# Patient Record
Sex: FEMALE | Race: WHITE | HISPANIC OR LATINO | Employment: UNEMPLOYED | URBAN - METROPOLITAN AREA
[De-identification: names, ages, dates, MRNs, and addresses within clinical notes are randomized per-mention and may not be internally consistent; named-entity substitution may affect disease eponyms.]

---

## 2020-10-12 ENCOUNTER — GYNECOLOGY VISIT (OUTPATIENT)
Dept: OBGYN | Facility: CLINIC | Age: 35
End: 2020-10-12

## 2020-10-12 VITALS
HEIGHT: 64 IN | SYSTOLIC BLOOD PRESSURE: 114 MMHG | WEIGHT: 170 LBS | DIASTOLIC BLOOD PRESSURE: 70 MMHG | BODY MASS INDEX: 29.02 KG/M2

## 2020-10-12 DIAGNOSIS — Z32.01 POSITIVE PREGNANCY TEST: ICD-10-CM

## 2020-10-12 DIAGNOSIS — N93.8 DUB (DYSFUNCTIONAL UTERINE BLEEDING): ICD-10-CM

## 2020-10-12 DIAGNOSIS — N92.6 MISSED MENSES: ICD-10-CM

## 2020-10-12 LAB
INT CON NEG: NEGATIVE
INT CON POS: POSITIVE
POC URINE PREGNANCY TEST: POSITIVE

## 2020-10-12 PROCEDURE — 76857 US EXAM PELVIC LIMITED: CPT | Performed by: OBSTETRICS & GYNECOLOGY

## 2020-10-12 PROCEDURE — 99202 OFFICE O/P NEW SF 15 MIN: CPT | Mod: 25 | Performed by: OBSTETRICS & GYNECOLOGY

## 2020-10-12 PROCEDURE — 81025 URINE PREGNANCY TEST: CPT | Performed by: OBSTETRICS & GYNECOLOGY

## 2020-10-12 NOTE — PROGRESS NOTES
GYN Visit    CC: missed menses    HPI: 34 y.o.  c/o missed period.  Patient's last menstrual period was 2020 (exact date).  Sure of period.  No VB currently, minimal nausea.  No concerns today.    Would be 12-week 3-day by sure LMP today.    ROS:  gen: feels well, denies concerns  abd: denies pain, negative nausea, negative vomiting  : denies vaginal bleeding, discharge, pain    OB History    Para Term  AB Living   2 1 1     1   SAB TAB Ectopic Molar Multiple Live Births             1      # Outcome Date GA Lbr Rafael/2nd Weight Sex Delivery Anes PTL Lv   2 Term 13 40w0d  3.175 kg (7 lb) F Vag-Spont  N MICHAEL   1                 Past Medical History:   Diagnosis Date   • Allergy    • Urinary tract infection, site not specified      Past Surgical History:   Procedure Laterality Date   • APPENDECTOMY     • OTHER ABDOMINAL SURGERY       Current Outpatient Medications on File Prior to Visit   Medication Sig Dispense Refill   • Prenatal MV-Min-Fe Fum-FA-DHA (PRENATAL 1 PO) Take  by mouth.     • docusate sodium (COLACE) 100 MG CAPS Take 1 Cap by mouth 2 times a day as needed for Constipation. (Patient not taking: Reported on 10/12/2020) 60 Cap 0   • ferrous sulfate 325 (65 FE) MG EC tablet Take 1 Tab by mouth every day. (Patient not taking: Reported on 10/12/2020) 30 Tab 1   • ibuprofen (MOTRIN) 600 MG TABS Take 1 Tab by mouth every 6 hours as needed (Cramping). (Patient not taking: Reported on 10/12/2020) 30 Tab 1   • oxycodone-acetaminophen (PERCOCET) 5-325 MG TABS Take 1 Tab by mouth every four hours as needed ((Pain Scale 4-6)). (Patient not taking: Reported on 10/12/2020) 15 Tab 0     No current facility-administered medications on file prior to visit.      Allergies: Nkda [no known drug allergy]    Family History   Problem Relation Age of Onset   • Diabetes Mother         Pills     Social History     Tobacco Use   • Smoking status: Never Smoker   • Smokeless tobacco: Never  "Used   Substance Use Topics   • Alcohol use: No   • Drug use: Never       PE:   /70   Ht 1.626 m (5' 4\")   Wt 77.1 kg (170 lb)   LMP 2020 (Exact Date)   BMI 29.18 kg/m²   gen; AAO, NAD, affect appropriate  abd: soft, NT, ND, no organomegaly  Ext: NT, edema  Skin: dry, intact, no lesions    OB US performed and per my read:    Indication: missed menses, +UPT    Transabdominal U/S  Single intrauterine pregnancy identified, variable position  Crown-rump length 5.86 cm, 12-week 3-days   positive fetal cardiac activity visualized.  Fluid grossly normal.  Right and left ovary not visualized, no masses noted.       Impression:   single live intrauterine pregnancy @ 12w3d .   EDC by US of 2021      A/P: 34 y.o.  w/ missed menses, +UPT  Viable IUP confirmed today  Dating: CROW 2021 by lmp c/w 12wk US today       RTC for NOB visit    Zhanna Porter MD  [facility], Women's Health    "

## 2020-10-12 NOTE — NON-PROVIDER
Pt here for DUB.    Pt states no complaints   Good# [phone]  LMP: 7/17/2020,  12w3d today   Pharmacy confirmed

## 2020-10-22 ENCOUNTER — APPOINTMENT (OUTPATIENT)
Dept: OBGYN | Facility: CLINIC | Age: 35
End: 2020-10-22
Payer: MEDICAID

## 2020-10-28 ENCOUNTER — APPOINTMENT (OUTPATIENT)
Dept: OBGYN | Facility: CLINIC | Age: 35
End: 2020-10-28

## 2020-10-29 ENCOUNTER — INITIAL PRENATAL (OUTPATIENT)
Dept: OBGYN | Facility: CLINIC | Age: 35
End: 2020-10-29

## 2020-10-29 ENCOUNTER — HOSPITAL ENCOUNTER (OUTPATIENT)
Facility: MEDICAL CENTER | Age: 35
End: 2020-10-29
Attending: NURSE PRACTITIONER
Payer: COMMERCIAL

## 2020-10-29 VITALS — WEIGHT: 173.6 LBS | DIASTOLIC BLOOD PRESSURE: 64 MMHG | SYSTOLIC BLOOD PRESSURE: 110 MMHG | BODY MASS INDEX: 29.8 KG/M2

## 2020-10-29 DIAGNOSIS — Z34.82 ENCOUNTER FOR SUPERVISION OF OTHER NORMAL PREGNANCY IN SECOND TRIMESTER: Primary | ICD-10-CM

## 2020-10-29 DIAGNOSIS — Z34.81 ENCOUNTER FOR SUPERVISION OF OTHER NORMAL PREGNANCY IN FIRST TRIMESTER: ICD-10-CM

## 2020-10-29 LAB
APPEARANCE UR: NORMAL
BILIRUB UR STRIP-MCNC: NORMAL MG/DL
COLOR UR AUTO: NORMAL
GLUCOSE UR STRIP.AUTO-MCNC: NEGATIVE MG/DL
KETONES UR STRIP.AUTO-MCNC: NEGATIVE MG/DL
LEUKOCYTE ESTERASE UR QL STRIP.AUTO: NORMAL
NITRITE UR QL STRIP.AUTO: NEGATIVE
PH UR STRIP.AUTO: 7 [PH] (ref 5–8)
PROT UR QL STRIP: NEGATIVE MG/DL
RBC UR QL AUTO: NEGATIVE
SP GR UR STRIP.AUTO: 1.02
UROBILINOGEN UR STRIP-MCNC: NORMAL MG/DL

## 2020-10-29 PROCEDURE — 90471 IMMUNIZATION ADMIN: CPT | Performed by: NURSE PRACTITIONER

## 2020-10-29 PROCEDURE — 8198 PREG CTR PKG RATE (SYSTEM): Performed by: NURSE PRACTITIONER

## 2020-10-29 PROCEDURE — 0500F INITIAL PRENATAL CARE VISIT: CPT | Performed by: NURSE PRACTITIONER

## 2020-10-29 PROCEDURE — 81002 URINALYSIS NONAUTO W/O SCOPE: CPT | Performed by: NURSE PRACTITIONER

## 2020-10-29 PROCEDURE — 90686 IIV4 VACC NO PRSV 0.5 ML IM: CPT | Performed by: NURSE PRACTITIONER

## 2020-10-29 ASSESSMENT — ENCOUNTER SYMPTOMS
GASTROINTESTINAL NEGATIVE: 1
CARDIOVASCULAR NEGATIVE: 1
MUSCULOSKELETAL NEGATIVE: 1
NEUROLOGICAL NEGATIVE: 1
RESPIRATORY NEGATIVE: 1
CONSTITUTIONAL NEGATIVE: 1
EYES NEGATIVE: 1
PSYCHIATRIC NEGATIVE: 1

## 2020-10-29 ASSESSMENT — EDINBURGH POSTNATAL DEPRESSION SCALE (EPDS)
I HAVE FELT SCARED OR PANICKY FOR NO GOOD REASON: NO, NOT AT ALL
I HAVE LOOKED FORWARD WITH ENJOYMENT TO THINGS: RATHER LESS THAN I USED TO
I HAVE BLAMED MYSELF UNNECESSARILY WHEN THINGS WENT WRONG: NO, NEVER
THINGS HAVE BEEN GETTING ON TOP OF ME: NO, I HAVE BEEN COPING AS WELL AS EVER
I HAVE BEEN ANXIOUS OR WORRIED FOR NO GOOD REASON: HARDLY EVER
I HAVE BEEN SO UNHAPPY THAT I HAVE BEEN CRYING: NO, NEVER
I HAVE FELT SAD OR MISERABLE: NO, NOT AT ALL
THE THOUGHT OF HARMING MYSELF HAS OCCURRED TO ME: NEVER
I HAVE BEEN SO UNHAPPY THAT I HAVE HAD DIFFICULTY SLEEPING: NOT AT ALL
TOTAL SCORE: 2
I HAVE BEEN ABLE TO LAUGH AND SEE THE FUNNY SIDE OF THINGS: AS MUCH AS I ALWAYS COULD

## 2020-10-29 NOTE — PROGRESS NOTES
NOB today  LMP: 07/17/2020  Last pap: Pt states she had one done in [place] about a year ago  Phone # [phone]  Pharmacy confirmed  On PNV  Cystic Fibrosis test offered and declined   Flu vaccine today  Flu vaccine given today, Left deltoid. Screening checklist reviewed with pt. Verified by Ju KEYS  Pt states that she was having some cramping and she was having spotting  c/o

## 2020-10-29 NOTE — LETTER
October 29, 2020            Lindsay Coulter is currently being cared for at [facility].  This patient is pregnant and may continue to work.  She should not lift greater than 20 pounds and requires frequent rest periods (10 minutes every two hours). 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

## 2020-10-30 DIAGNOSIS — Z34.82 ENCOUNTER FOR SUPERVISION OF OTHER NORMAL PREGNANCY IN SECOND TRIMESTER: ICD-10-CM

## 2020-11-02 LAB
C TRACH DNA GENITAL QL NAA+PROBE: NEGATIVE
CYTOLOGY REG CYTOL: NORMAL
HPV HR 12 DNA CVX QL NAA+PROBE: NEGATIVE
HPV16 DNA SPEC QL NAA+PROBE: NEGATIVE
HPV18 DNA SPEC QL NAA+PROBE: NEGATIVE
N GONORRHOEA DNA GENITAL QL NAA+PROBE: NEGATIVE
SPECIMEN SOURCE: NORMAL
SPECIMEN SOURCE: NORMAL

## 2020-11-06 PROBLEM — O09.529 ENCOUNTER FOR SUPERVISION OF HIGH-RISK PREGNANCY WITH MULTIGRAVIDA OF ADVANCED MATERNAL AGE: Status: ACTIVE | Noted: 2020-11-06

## 2020-12-02 ENCOUNTER — HOSPITAL ENCOUNTER (OUTPATIENT)
Dept: LAB | Facility: MEDICAL CENTER | Age: 35
End: 2020-12-02
Attending: NURSE PRACTITIONER
Payer: COMMERCIAL

## 2020-12-02 DIAGNOSIS — Z34.82 ENCOUNTER FOR SUPERVISION OF OTHER NORMAL PREGNANCY IN SECOND TRIMESTER: ICD-10-CM

## 2020-12-02 LAB
ABO GROUP BLD: NORMAL
BASOPHILS # BLD AUTO: 0.5 % (ref 0–1.8)
BASOPHILS # BLD: 0.05 K/UL (ref 0–0.12)
BLD GP AB SCN SERPL QL: NORMAL
EOSINOPHIL # BLD AUTO: 0.29 K/UL (ref 0–0.51)
EOSINOPHIL NFR BLD: 3.1 % (ref 0–6.9)
ERYTHROCYTE [DISTWIDTH] IN BLOOD BY AUTOMATED COUNT: 49.3 FL (ref 35.9–50)
HBV SURFACE AG SER QL: ABNORMAL
HCT VFR BLD AUTO: 37.7 % (ref 37–47)
HCV AB SER QL: ABNORMAL
HGB BLD-MCNC: 12.5 G/DL (ref 12–16)
HIV 1+2 AB+HIV1 P24 AG SERPL QL IA: NORMAL
IMM GRANULOCYTES # BLD AUTO: 0.05 K/UL (ref 0–0.11)
IMM GRANULOCYTES NFR BLD AUTO: 0.5 % (ref 0–0.9)
LYMPHOCYTES # BLD AUTO: 1.17 K/UL (ref 1–4.8)
LYMPHOCYTES NFR BLD: 12.7 % (ref 22–41)
MCH RBC QN AUTO: 31.3 PG (ref 27–33)
MCHC RBC AUTO-ENTMCNC: 33.2 G/DL (ref 33.6–35)
MCV RBC AUTO: 94.5 FL (ref 81.4–97.8)
MONOCYTES # BLD AUTO: 0.54 K/UL (ref 0–0.85)
MONOCYTES NFR BLD AUTO: 5.9 % (ref 0–13.4)
NEUTROPHILS # BLD AUTO: 7.12 K/UL (ref 2–7.15)
NEUTROPHILS NFR BLD: 77.3 % (ref 44–72)
NRBC # BLD AUTO: 0 K/UL
NRBC BLD-RTO: 0 /100 WBC
PLATELET # BLD AUTO: 197 K/UL (ref 164–446)
PMV BLD AUTO: 10.6 FL (ref 9–12.9)
RBC # BLD AUTO: 3.99 M/UL (ref 4.2–5.4)
RH BLD: NORMAL
RUBV AB SER QL: 107 IU/ML
TREPONEMA PALLIDUM IGG+IGM AB [PRESENCE] IN SERUM OR PLASMA BY IMMUNOASSAY: ABNORMAL
WBC # BLD AUTO: 9.2 K/UL (ref 4.8–10.8)

## 2020-12-05 LAB
# FETUSES US: NORMAL
AFP MOM SERPL: 1.6
AFP SERPL-MCNC: 79 NG/ML
AGE - REPORTED: 36.5 YR
AMPHET CTO UR CFM-MCNC: NEGATIVE NG/ML
BARBITURATES CTO UR CFM-MCNC: NEGATIVE NG/ML
BENZODIAZ CTO UR CFM-MCNC: NEGATIVE NG/ML
CANNABINOIDS CTO UR CFM-MCNC: NEGATIVE NG/ML
COCAINE CTO UR CFM-MCNC: NEGATIVE NG/ML
CURRENT SMOKER: NO
DRUG COMMENT 753798: NORMAL
FAMILY MEMBER DISEASES HX: NO
GA METHOD: NORMAL
GA: NORMAL WK
HCG MOM SERPL: 1.31
HCG SERPL-ACNC: NORMAL IU/L
HX OF HEREDITARY DISORDERS: NO
IDDM PATIENT QL: NO
INHIBIN A MOM SERPL: 0.68
INHIBIN A SERPL-MCNC: 107 PG/ML
INTEGRATED SCN PATIENT-IMP: NORMAL
METHADONE CTO UR CFM-MCNC: NEGATIVE NG/ML
OPIATES CTO UR CFM-MCNC: NEGATIVE NG/ML
PATHOLOGY STUDY: NORMAL
PCP CTO UR CFM-MCNC: NEGATIVE NG/ML
PROPOXYPH CTO UR CFM-MCNC: NEGATIVE NG/ML
SPECIMEN DRAWN SERPL: NORMAL
U ESTRIOL MOM SERPL: 0.68
U ESTRIOL SERPL-MCNC: 1.66 NG/ML

## 2020-12-10 ENCOUNTER — APPOINTMENT (OUTPATIENT)
Dept: RADIOLOGY | Facility: IMAGING CENTER | Age: 35
End: 2020-12-10
Attending: NURSE PRACTITIONER

## 2020-12-10 DIAGNOSIS — Z34.82 ENCOUNTER FOR SUPERVISION OF OTHER NORMAL PREGNANCY IN SECOND TRIMESTER: ICD-10-CM

## 2020-12-10 PROCEDURE — 76805 OB US >/= 14 WKS SNGL FETUS: CPT | Mod: TC | Performed by: NURSE PRACTITIONER

## 2020-12-11 DIAGNOSIS — O28.3 ABNORMAL FETAL ULTRASOUND: Primary | ICD-10-CM

## 2020-12-14 ENCOUNTER — HOSPITAL ENCOUNTER (OUTPATIENT)
Facility: MEDICAL CENTER | Age: 35
End: 2020-12-14
Attending: NURSE PRACTITIONER
Payer: COMMERCIAL

## 2020-12-14 ENCOUNTER — ROUTINE PRENATAL (OUTPATIENT)
Dept: OBGYN | Facility: CLINIC | Age: 35
End: 2020-12-14

## 2020-12-14 VITALS — WEIGHT: 179 LBS | BODY MASS INDEX: 30.73 KG/M2 | SYSTOLIC BLOOD PRESSURE: 118 MMHG | DIASTOLIC BLOOD PRESSURE: 58 MMHG

## 2020-12-14 DIAGNOSIS — O28.3 ABNORMAL FETAL ULTRASOUND: ICD-10-CM

## 2020-12-14 DIAGNOSIS — O09.529 ENCOUNTER FOR SUPERVISION OF HIGH-RISK PREGNANCY WITH MULTIGRAVIDA OF ADVANCED MATERNAL AGE: Primary | ICD-10-CM

## 2020-12-14 DIAGNOSIS — O09.529 ENCOUNTER FOR SUPERVISION OF HIGH-RISK PREGNANCY WITH MULTIGRAVIDA OF ADVANCED MATERNAL AGE: ICD-10-CM

## 2020-12-14 PROCEDURE — 90040 PR PRENATAL FOLLOW UP: CPT | Performed by: NURSE PRACTITIONER

## 2020-12-14 NOTE — PROGRESS NOTES
S:  Pt is  at 21w3d here for routine OB follow up.  Reports some vaginal DC or liquid.  Reports good FM.  Denies VB, LOF, RUCs, or vaginal DC.  Denies cough, SOB, sore throat or fever.  Denies exposure to anyone with COVID 19.    O:    Vitals:    20 1528   Weight: 81.2 kg (179 lb)           FHTs: 150        Fundal ht: 20 cm.        AFP: wnl -- reviewed w pt.       SSE: neg ferning, neg pooling, neg nitrazine, cx appears closed    Complete OB US  12/10/2020 1:21 PM     HISTORY/REASON FOR EXAM:  Evaluate fetal anatomy, alpha-fetoprotein within normal limits     TECHNIQUE/EXAM DESCRIPTION: OB complete ultrasound.     COMPARISON:  None     FINDINGS:  Fetal Lie:  Transverse  LMP:  2020  Clinical CROW by LMP:  2021     Placenta (Location):  Anterior  Placenta Previa: No  Placental Grade: II     Amniotic Fluid Volume:  RENAN = 7.63 cm     Fetal Heart Rate:  157 bpm     Cervical Length:  4.27 cm transabdominal     No maternal adnexal mass is identified.     Umbilical Artery S/D Ratio(s):  Not applicable     Fetal Anatomy  (Seen or Not Seen)  Lateral Ventricles     Seen  Cisterna Magna        Seen  Cerebellum              Seen  CSP             Seen  Orbits             Seen  Face/Lips                Seen  Cord Insertion         Seen  Placental CI         Seen  4 Chamber Heart     NOT seen  LVOT               NOT seen  RVOT              Seen  3 Vessel View     NOT seen  Stomach       Seen  Kidneys                   Seen  Urinary Bladder      Seen  Spine                       Seen  3 Vessel Cord          Seen  Both Upper Extremities    Seen  Both Lower Extremities    Seen  Diaphragm             Seen  Movement       Seen  Gender:  Likely female     Fetal Biometry  BPD    4.19 cm, 18 weeks, 5 days, (Less than 1%)  HC    16.60 cm, 19 weeks, 2 days, (1.6%)  AC    13.58 cm, 19 weeks, (3.9%)  Femur Length    3.32 cm, 20 weeks, 3 days, (25.6%)  Humerus Length    2.93 cm, 19 weeks, 4 days, (8.9%)  Cerebellum  Diameter   2.02 cm, (15.6%)     EGA by this US:  19 weeks, 3 days  CROW by this US: 5/3/2021  CROW by 1st US:  4/23/2021 by MD     Estimated Fetal Weight:  302 grams  EFW Percentile: 4.6%     Comments:  There is limited visualization of the cardiac structures in part due to the apparent off axis positioning. Slightly limited visualization of the facial lip region.     IMPRESSION:     1.  Single intrauterine pregnancy of an estimated gestational age of 19 weeks, 3 days with an estimated date of delivery of 5/3/2021.  2.  Low RENAN of 7.63 cm.  3.  Limited visualization of cardiac structures in part due to apparent off axis positioning or malrotation of the heart.  4.  There is limited visualization of the facial and lip region.    A:  IUP 21w3d  Patient Active Problem List    Diagnosis Date Noted   • Abnormal fetal ultrasound 12/11/2020   • Encounter for supervision of high-risk pregnancy with multigravida of advanced maternal age 11/06/2020        P:  1.  Reviewed labs, ultrasound w pt.          2.  Questions answered.          3.  Encouraged adequate water intake        4.  F/u 4 wks.        5.  Oki appt - not made yet, encouraged pt to make asap.        6.  Vaginal pathogen swab sent.    Chaperone offered and provided by Renee Pinedo MA.

## 2020-12-14 NOTE — PROGRESS NOTES
Pt here today for OB follow up  Reports light FM  WT:  179 lb  BP: 118/58  Preferred pharmacy verified with pt.  Had US on 12/10/2020  Appt with Dr. Alvarado: none yet (provider to go over U/S results with pt)   Pt states has been having vaginal yellowish discharge with some odor, denies itching. States no other complaints or concerns   Good # [phone]

## 2020-12-15 LAB
CANDIDA DNA VAG QL PROBE+SIG AMP: NEGATIVE
G VAGINALIS DNA VAG QL PROBE+SIG AMP: NEGATIVE
T VAGINALIS DNA VAG QL PROBE+SIG AMP: NEGATIVE

## 2021-01-11 ENCOUNTER — ROUTINE PRENATAL (OUTPATIENT)
Dept: OBGYN | Facility: CLINIC | Age: 36
End: 2021-01-11

## 2021-01-11 VITALS — WEIGHT: 184 LBS | BODY MASS INDEX: 31.58 KG/M2 | DIASTOLIC BLOOD PRESSURE: 68 MMHG | SYSTOLIC BLOOD PRESSURE: 108 MMHG

## 2021-01-11 DIAGNOSIS — Z3A.25 25 WEEKS GESTATION OF PREGNANCY: ICD-10-CM

## 2021-01-11 DIAGNOSIS — Z87.898 HISTORY OF BIRTH TRAUMA: ICD-10-CM

## 2021-01-11 PROBLEM — Z87.59 HISTORY OF SHOULDER DYSTOCIA IN PRIOR PREGNANCY: Status: ACTIVE | Noted: 2021-01-11

## 2021-01-11 PROCEDURE — 90040 PR PRENATAL FOLLOW UP: CPT | Performed by: NURSE PRACTITIONER

## 2021-01-11 NOTE — PROGRESS NOTES
SUBJECTIVE:  Pt is a 35 y.o.   at 25w3d  gestation. Presents today for follow-up prenatal care. Reports no issues at this time.  Reports fetal movement. Denies regular cramping/contractions, bleeding or leaking of fluid. Denies dysuria, headaches, N/V. Generally feels well today. Reports she had a very bad experience with her last birth with us where she did not feel attended to, she was not helped with the pushing the phase and just generally was displeased with the way she was treated. She is worried about this happening again.     OBJECTIVE:  - See prenatal vitals flow  -   Vitals:    21 1411   BP: 108/68   Weight: 83.5 kg (184 lb)                 ASSESSMENT:   - IUP at 25w3d    - S=D   -   Patient Active Problem List    Diagnosis Date Noted   • Abnormal fetal ultrasound 2020   • Encounter for supervision of high-risk pregnancy with multigravida of advanced maternal age 2020         PLAN:  - S/sx pregnancy and labor warning signs vs general discomforts discussed  - Fetal movements and/or kick counts reviewed   - Adequate hydration reinforced  - Nutrition/exercise/vitamin education; continue PNV  - S/p Flu vacc  - Reviewed that the team in  years was very different and hopefully we can support her in having a better experience this time, including being more attentive in labor and during the pushing stage, pt did not use an epidural last time although in her chart it says she did and she is not sure if she wants to use this time either   - Third tri labs ordered  - No further follow up with Oki  - Anticipatory guidance given  - RTC in 4 weeks for follow-up prenatal care

## 2021-01-11 NOTE — PROGRESS NOTES
OB follow up   + fetal movement.  No VB, LOF or UC's.  Pt states she has been having some leaking everyday   Flu vaccine current  Pt states she saw Dr. Alvarado on 1/4/21  [phone] (home)   Preferred pharmacy confirmed.

## 2021-01-21 ENCOUNTER — HOSPITAL ENCOUNTER (OUTPATIENT)
Dept: LAB | Facility: MEDICAL CENTER | Age: 36
End: 2021-01-21
Attending: NURSE PRACTITIONER
Payer: MEDICAID

## 2021-01-21 DIAGNOSIS — Z3A.25 25 WEEKS GESTATION OF PREGNANCY: ICD-10-CM

## 2021-01-21 LAB
ERYTHROCYTE [DISTWIDTH] IN BLOOD BY AUTOMATED COUNT: 45.2 FL (ref 35.9–50)
GLUCOSE 1H P 50 G GLC PO SERPL-MCNC: 168 MG/DL (ref 70–139)
HCT VFR BLD AUTO: 35.5 % (ref 37–47)
HGB BLD-MCNC: 11.7 G/DL (ref 12–16)
MCH RBC QN AUTO: 31.5 PG (ref 27–33)
MCHC RBC AUTO-ENTMCNC: 33 G/DL (ref 33.6–35)
MCV RBC AUTO: 95.4 FL (ref 81.4–97.8)
PLATELET # BLD AUTO: 205 K/UL (ref 164–446)
PMV BLD AUTO: 10.5 FL (ref 9–12.9)
RBC # BLD AUTO: 3.72 M/UL (ref 4.2–5.4)
TREPONEMA PALLIDUM IGG+IGM AB [PRESENCE] IN SERUM OR PLASMA BY IMMUNOASSAY: NORMAL
WBC # BLD AUTO: 7.6 K/UL (ref 4.8–10.8)

## 2021-01-21 PROCEDURE — 86780 TREPONEMA PALLIDUM: CPT

## 2021-01-21 PROCEDURE — 85027 COMPLETE CBC AUTOMATED: CPT

## 2021-01-21 PROCEDURE — 36415 COLL VENOUS BLD VENIPUNCTURE: CPT

## 2021-01-21 PROCEDURE — 82950 GLUCOSE TEST: CPT

## 2021-01-22 ENCOUNTER — TELEPHONE (OUTPATIENT)
Dept: OBGYN | Facility: CLINIC | Age: 36
End: 2021-01-22

## 2021-01-22 DIAGNOSIS — R73.09 ELEVATED GLUCOSE: ICD-10-CM

## 2021-01-22 NOTE — TELEPHONE ENCOUNTER
----- Message from CRISTAL Johnson sent at 1/22/2021  9:21 AM PST -----  Pt needs three hour. Ordered      Pt notified of abnormal 1hr gtt and need to do 3hr gtt this time. Pt instructed to fast 10-12hr prior to testing. Pt informed she is only allow to drink plain water during fasting time. Advised to bring a snack for after the test is done. Pt notified will be staying in the labs for the 3hr. Pt agreed to do it Monday 1/25/2021. Pt verbalized understanding.

## 2021-01-25 ENCOUNTER — HOSPITAL ENCOUNTER (OUTPATIENT)
Dept: LAB | Facility: MEDICAL CENTER | Age: 36
End: 2021-01-25
Attending: NURSE PRACTITIONER
Payer: MEDICAID

## 2021-01-25 DIAGNOSIS — R73.09 ELEVATED GLUCOSE: ICD-10-CM

## 2021-01-25 LAB
GLUCOSE 1H P CHAL SERPL-MCNC: 170 MG/DL (ref 65–180)
GLUCOSE 2H P CHAL SERPL-MCNC: 161 MG/DL (ref 65–155)
GLUCOSE 3H P CHAL SERPL-MCNC: 134 MG/DL (ref 65–140)
GLUCOSE BS SERPL-MCNC: 80 MG/DL (ref 65–95)

## 2021-01-25 PROCEDURE — 82951 GLUCOSE TOLERANCE TEST (GTT): CPT

## 2021-01-25 PROCEDURE — 82952 GTT-ADDED SAMPLES: CPT

## 2021-01-25 PROCEDURE — 36415 COLL VENOUS BLD VENIPUNCTURE: CPT

## 2021-02-08 ENCOUNTER — ROUTINE PRENATAL (OUTPATIENT)
Dept: OBGYN | Facility: CLINIC | Age: 36
End: 2021-02-08

## 2021-02-08 VITALS — DIASTOLIC BLOOD PRESSURE: 70 MMHG | BODY MASS INDEX: 31.93 KG/M2 | SYSTOLIC BLOOD PRESSURE: 112 MMHG | WEIGHT: 186 LBS

## 2021-02-08 DIAGNOSIS — O09.529 ENCOUNTER FOR SUPERVISION OF HIGH-RISK PREGNANCY WITH MULTIGRAVIDA OF ADVANCED MATERNAL AGE: Primary | ICD-10-CM

## 2021-02-08 DIAGNOSIS — R73.09 ABNORMAL GTT (GLUCOSE TOLERANCE TEST): ICD-10-CM

## 2021-02-08 PROCEDURE — 90471 IMMUNIZATION ADMIN: CPT | Performed by: NURSE PRACTITIONER

## 2021-02-08 PROCEDURE — 90040 PR PRENATAL FOLLOW UP: CPT | Performed by: NURSE PRACTITIONER

## 2021-02-08 PROCEDURE — 90715 TDAP VACCINE 7 YRS/> IM: CPT | Performed by: NURSE PRACTITIONER

## 2021-02-08 NOTE — PROGRESS NOTES
S:  Pt is  at 29w3d for routine OB follow up.  No c/o today.  Reports good FM.  Denies VB, LOF, RUCs or vaginal DC. Denies cough, SOB, sore throat or fever.  Denies exposure to anyone with COVID 19.    O:    Vitals:    21 1058   BP: 112/70   Weight: 84.4 kg (186 lb)           FHTs: 150        Fundal ht: 28 cm.    A:  IUP at 29w3d  Patient Active Problem List    Diagnosis Date Noted   • Abnormal GTT (glucose tolerance test) 2021   • History of birth trauma 2021   • History of shoulder dystocia x 20 sec in prior pregnancy 2021   • Abnormal fetal ultrasound 2020   • Encounter for supervision of high-risk pregnancy with multigravida of advanced maternal age 2020        P:  1.  PP contraception condoms.  Declined BTL.         2.  Instructions given on FKCs.          3.  Questions answered.          4.  Encourage good hand hygiene, social distancing and avoiding sick contacts.        5.  Encourage adequate water intake.        6.  1hr abnormal > 3hr WNL - reviewed w pt.        7.   labor precautions reviewed.         8.  F/u 2 wks.        9.  TDap given.    I have placed the below orders and discussed them with an approved delegating provider. The MA is performing the below orders under the direction of  Dr. Frausto.

## 2021-02-08 NOTE — LETTER
Cuente los Movimientos de delgado Bebé  Otro paso importante para la star de delgado bebé    Lindsay Coulter     [facility] [place][note 2]            Dept: [phone]    ¿Cuántas semanas tiene de embarazo? 29w3d    Fecha cuando tiene que comenzar a contar el movimiento: 2/8/21                  Luis A debe usar maldonado diagrama    Octavio manera en que delgado doctor puede controlar a star de delgado bebé es sabiendo cuantas veces se mueve delgado bebé en el útero, o por medio de las “pataditas”.  Usted podrá ayudarle a delgado médico al usar cada día el siguiente diagrama.    Cada día, usted debe prestar atención a cuantas horas le lleva a delgado bebé moverse 10 veces.  Comience a contar en la mañana, lo antes posible después de haberse levantado.    · Primeramente, escriba la hora en que se mueve delgado bebé, hasta llegar a 10 veces.  · Colóquele un check o palomita a cada cuadrito cada vez que delgado bebé se mueva hasta que complete 10 veces.  · Escriba la hora cuando termine de contar 10 veces en la última columna.  · Sume el total del tiempo que le llevó contar los 10 movimientos.  · Finalmente, complete el cuadrito de cuantas horas le llevó hacerlo.    Después de fabien contado los 10 movimientos, ya no tendrá que contar los demás movimientos por el max del día.  A la mañana siguiente, comience a contar de nuevo cuantas veces se mueve el bebé desde el momento en que se levante.    ¿Qué tendría que considerarse un “movimiento”?  Es difícil de decirlo porque es distinto de octavio madre a otra, y de un embarazo a otro.  Lo importante es que cuente el movimiento de la misma manera cm el transcurso de delgado embarazo.  Si tiene preguntas adicionales, pregúntele a delgado doctor.    ¡Cuente cuidadosamente cada día!     MUESTRA:  Semana 28    ¿Cuántas horas le ha llevado sentir 10 movimientos?        Hora de Inicio     1     2     3     4     5     6     7     8     9     10   Hora de Finlizar   Maritza. 8:20 ·  ·  ·  ·  ·  ·  ·  ·  ·  ·  11:40      Mar.               Mié.               Jue.               Vie.               Sáb.               Dom.                 IMPORTANTE:  Usted debe contactar a delgado doctor si le lleva más de 2 horas sentir 10 movimientos de delgado bebé.    Cada mañana, escriba la hora de inicio y comience a contar los movimientos de delgado bebé.  Hágalo colocándole un check o palomita a cada cuadrito cada vez que sienta un movimiento de delgado bebé.  Cuando haya sentido 10 “pataditas”, escriba la hora en que terminó de contar en la última columna.  Luego, complete en la cajita (arriba de la sammy de check o palomita) el número total de horas que le llevó hacerlo.  Asegúrese de leer completamente las instrucciones en la página anterior.

## 2021-02-08 NOTE — PATIENT INSTRUCTIONS
P:  1.  PP contraception condoms.  Declined BTL.         2.  Instructions given on FKCs.          3.  Questions answered.          4.  Encourage good hand hygiene, social distancing and avoiding sick contacts.        5.  Encourage adequate water intake.        6.  1hr abnormal > 3hr WNL - reviewed w pt.        7.   labor precautions reviewed.         8.  F/u 2 wks.        9.  TDap given.

## 2021-02-08 NOTE — PROGRESS NOTES
OB follow up   + fetal movement.  No VB, LOF or UC's.  Phone # [phone]  Preferred pharmacy confirmed.  Kick count sheet given today.  BTL declined  TDap given to patient. L Deltoid. Screening checklist reviewed with patient. VIS given. Verified by AC

## 2021-02-22 ENCOUNTER — ROUTINE PRENATAL (OUTPATIENT)
Dept: OBGYN | Facility: CLINIC | Age: 36
End: 2021-02-22

## 2021-02-22 VITALS — WEIGHT: 190 LBS | DIASTOLIC BLOOD PRESSURE: 60 MMHG | SYSTOLIC BLOOD PRESSURE: 104 MMHG | BODY MASS INDEX: 32.61 KG/M2

## 2021-02-22 DIAGNOSIS — O09.529 ENCOUNTER FOR SUPERVISION OF HIGH-RISK PREGNANCY WITH MULTIGRAVIDA OF ADVANCED MATERNAL AGE: Primary | ICD-10-CM

## 2021-02-22 PROCEDURE — 90040 PR PRENATAL FOLLOW UP: CPT | Performed by: NURSE PRACTITIONER

## 2021-02-22 NOTE — PROGRESS NOTES
Pt. Here for OB/FU. Reports Good FM.   Good # [phone]  Pt. Denies VB, LOF, or UC's.   Pharmacy verified.   Chaperone offered and not indicated  Pt states no concerns as of right now.

## 2021-02-22 NOTE — PROGRESS NOTES
S) Pt is a 35 y.o.   at 31w3d  gestation. Routine prenatal care today. No concerns today. Labs and US are up to date. Declines BTL.  labor precautions reviewed, all questions answered.    Fetal movement Normal  Cramping no  VB no  LOF no   Denies dysuria. Generally feels well today. Good self-care activities identified. Denies headaches, swelling, visual changes, or epigastric pain .     O) /60   Wt 86.2 kg (190 lb)         Labs:       PNL: WNL       GCT: 168, but 3 hour WNL        AFP: normal       GBS: N/A       Pertinent ultrasound -        12/10/20- Survey with limited visualization of cardiac structures and facial/lip structures, remainder WNL, RENAN 7.63cm, c/w prev dating. Referral to Dr Alvarado.  21- Dr Alvarado survey- AFO noted, remainder WNL, RENAN WNL, c/w prev dating. Recommend  echo    A) IUP at 31w3d       S=D         Patient Active Problem List    Diagnosis Date Noted   • Abnormal GTT (glucose tolerance test) 2021   • History of birth trauma 2021   • History of shoulder dystocia x 20 sec in prior pregnancy 2021   • Abnormal fetal ultrasound 2020   • Encounter for supervision of high-risk pregnancy with multigravida of advanced maternal age 2020          SVE: deferred       Chaperone offered: n/a         TDAP: yes       FLU: yes        BTL: no       : n/a       C/S Consent: n/a       IOL or C/S scheduled: no       LAST PAP: 10/29/20- negative         P) s/s ptl vs general discomforts. Fetal movements reviewed. General ed and anticipatory guidance. Nutrition/exercise/vitamin. Plans breast Plans pp contraception- unsure  Continue PNV.

## 2021-03-08 ENCOUNTER — ROUTINE PRENATAL (OUTPATIENT)
Dept: OBGYN | Facility: CLINIC | Age: 36
End: 2021-03-08

## 2021-03-08 VITALS — BODY MASS INDEX: 32.61 KG/M2 | WEIGHT: 190 LBS | SYSTOLIC BLOOD PRESSURE: 112 MMHG | DIASTOLIC BLOOD PRESSURE: 64 MMHG

## 2021-03-08 DIAGNOSIS — Z3A.33 33 WEEKS GESTATION OF PREGNANCY: ICD-10-CM

## 2021-03-08 PROCEDURE — 90040 PR PRENATAL FOLLOW UP: CPT | Performed by: NURSE PRACTITIONER

## 2021-03-08 NOTE — PROGRESS NOTES
OB follow up   + fetal movement.  No VB, LOF or UC's.  Pt states she has no concerns at this time  Tdap and Flu vaccine current  [phone] (home)    Preferred pharmacy confirmed.

## 2021-03-08 NOTE — PROGRESS NOTES
SUBJECTIVE:  Pt is a 35 y.o.   at 33w3d  gestation. Presents today for follow-up prenatal care. Reports no issues at this time.  Reports good  fetal movement. Denies regular cramping/contractions, bleeding or leaking of fluid. Denies dysuria, headaches, N/V. Generally feels well today.     OBJECTIVE:  - See prenatal vitals flow  -   Vitals:    21 1354   BP: 112/64   Weight: 86.2 kg (190 lb)                 ASSESSMENT:   - IUP at 33w3d    - S=D   -   Patient Active Problem List    Diagnosis Date Noted   • History of birth trauma 2021   • History of shoulder dystocia x 20 sec in prior pregnancy 2021   • Abnormal fetal ultrasound 2020   • Encounter for supervision of high-risk pregnancy with multigravida of advanced maternal age 2020         PLAN:  - S/sx pregnancy and labor warning signs vs general discomforts discussed  - Fetal movements and/or kick counts reviewed   - Adequate hydration reinforced  - Nutrition/exercise/vitamin education; continue PNV  - Plans unsure for contraception Pp: handout given and reviewed  - S/p Tdap vacc   - S/p Flu vacc   - Anticipatory guidance given  - RTC in 2 weeks for follow-up prenatal care

## 2021-03-22 ENCOUNTER — ROUTINE PRENATAL (OUTPATIENT)
Dept: OBGYN | Facility: CLINIC | Age: 36
End: 2021-03-22

## 2021-03-22 VITALS — DIASTOLIC BLOOD PRESSURE: 60 MMHG | WEIGHT: 193 LBS | BODY MASS INDEX: 33.13 KG/M2 | SYSTOLIC BLOOD PRESSURE: 118 MMHG

## 2021-03-22 DIAGNOSIS — O09.529 ENCOUNTER FOR SUPERVISION OF HIGH-RISK PREGNANCY WITH MULTIGRAVIDA OF ADVANCED MATERNAL AGE: Primary | ICD-10-CM

## 2021-03-22 PROCEDURE — 90040 PR PRENATAL FOLLOW UP: CPT | Performed by: NURSE PRACTITIONER

## 2021-03-22 NOTE — PATIENT INSTRUCTIONS
P:  1.  GBS @ 36 wks.          2.  Continue FKCs.          3.  Questions answered.          4.  L&D policies reviewed w pt.        5.  Encourage adequate water intake.        6.  F/u 1 wks.        7.  Will need weekly NST starting @ next visit.

## 2021-03-22 NOTE — PROGRESS NOTES
S:  Pt is  at 35w3d for routine OB follow up.  Reports an occ UC, perhaps 4-5 per day.  Reports good FM.  Denies VB, LOF, RUCs or vaginal DC.  Denies cough, SOB, sore throat or fever.  Denies exposure to anyone with COVID 19.    O:    Vitals:    21 1335   BP: 118/60   Weight: 87.5 kg (193 lb)           FHTs: 145        Fundal ht: 34 cm.        Fetal position: vertex    A:  IUP at 35w3d  Patient Active Problem List    Diagnosis Date Noted   • History of birth trauma 2021   • History of shoulder dystocia x 20 sec in prior pregnancy 2021   • Abnormal fetal ultrasound 2020   • Encounter for supervision of high-risk pregnancy with multigravida of advanced maternal age 2020        P:  1.  GBS @ 36 wks.          2.  Continue FKCs.          3.  Questions answered.          4.  L&D policies reviewed w pt.        5.  Encourage adequate water intake.        6.  F/u 1 wks.        7.  Will need weekly NST starting @ next visit.

## 2021-03-22 NOTE — PROGRESS NOTES
OB follow up   + fetal movement.  No VB, LOF or UC's.  Phone #  [phone]  Preferred pharmacy confirmed.

## 2021-03-29 ENCOUNTER — ROUTINE PRENATAL (OUTPATIENT)
Dept: OBGYN | Facility: CLINIC | Age: 36
End: 2021-03-29

## 2021-03-29 ENCOUNTER — HOSPITAL ENCOUNTER (OUTPATIENT)
Facility: MEDICAL CENTER | Age: 36
End: 2021-03-29
Attending: NURSE PRACTITIONER
Payer: COMMERCIAL

## 2021-03-29 VITALS — DIASTOLIC BLOOD PRESSURE: 60 MMHG | SYSTOLIC BLOOD PRESSURE: 104 MMHG | BODY MASS INDEX: 33.3 KG/M2 | WEIGHT: 194 LBS

## 2021-03-29 DIAGNOSIS — O09.529 ENCOUNTER FOR SUPERVISION OF HIGH-RISK PREGNANCY WITH MULTIGRAVIDA OF ADVANCED MATERNAL AGE: ICD-10-CM

## 2021-03-29 DIAGNOSIS — O09.529 ENCOUNTER FOR SUPERVISION OF HIGH-RISK PREGNANCY WITH MULTIGRAVIDA OF ADVANCED MATERNAL AGE: Primary | ICD-10-CM

## 2021-03-29 LAB
NST ACOUSTIC STIMULATION: NORMAL
NST ACTION NECESSARY: NORMAL
NST ASSESSMENT: NORMAL
NST BASELINE: NORMAL
NST INDICATIONS: NORMAL
NST OTHER DATA: NORMAL
NST READ BY: NORMAL
NST RETURN: NORMAL
NST UTERINE ACTIVITY: NORMAL

## 2021-03-29 PROCEDURE — 90040 PR PRENATAL FOLLOW UP: CPT | Performed by: NURSE PRACTITIONER

## 2021-03-29 NOTE — PROGRESS NOTES
[facility]    [address]    [facility] [address]    Phone:  [phone]    Fax:  [phone]                                       Ramon Winter   MRN: [id number]    Department:  [facility]   Date of Visit:  9/22/2017           After Visit Summary Signature Page     I have received my discharge instructions, and my questions have been answered. I have discussed any challenges I see with this plan with the nurse or doctor.    ..........................................................................................................................................  Patient/Patient Representative Signature      ..........................................................................................................................................  Patient Representative Print Name and Relationship to Patient    ..................................................               ................................................  Date                                            Time    ..........................................................................................................................................  Reviewed by Signature/Title    ...................................................              ..............................................  Date                                                            Time           S:  Pt is  at 36w3d here for routine OB follow up.  No c/o today.  Reports good FM.  Denies VB, LOF, RUCs, or vaginal DC. Denies cough, SOB, sore throat or fever.  Denies exposure to anyone with COVID 19.    O:    Vitals:    21 1405   BP: 104/60   Weight: 88 kg (194 lb)           FHTs: 140        Fundal ht: 36 cm.        Fetal position: vertex.    A:  IUP at 36w3d  NST reactive, Cat I FHTs  Patient Active Problem List    Diagnosis Date Noted   • History of birth trauma 2021   • History of shoulder dystocia x 20 sec in prior pregnancy 2021   • Abnormal fetal ultrasound 2020   • Encounter for supervision of high-risk pregnancy with multigravida of advanced maternal age 2020       P:  1.  GBS obtained.          2.  Labor precautions given.  Instructions given on where to go.  Pt receptive to              education.          3.  Questions answered.          4.  Continue FKCs.          5.  Encouraged adequate water intake        6.  F/u 1 wk.        7.  Growth US ordered.         8.  Continue weekly NST    Chaperone offered and provided by Ela Haile MA.

## 2021-03-29 NOTE — PROGRESS NOTES
OB follow up   + fetal movement.  No VB, LOF or UC's.  Phone # [phone]  Preferred pharmacy confirmed.  GBS today

## 2021-04-01 LAB — GP B STREP DNA SPEC QL NAA+PROBE: NEGATIVE

## 2021-04-05 ENCOUNTER — ROUTINE PRENATAL (OUTPATIENT)
Dept: OBGYN | Facility: CLINIC | Age: 36
End: 2021-04-05

## 2021-04-05 DIAGNOSIS — O09.529 ENCOUNTER FOR SUPERVISION OF HIGH-RISK PREGNANCY WITH MULTIGRAVIDA OF ADVANCED MATERNAL AGE: Primary | ICD-10-CM

## 2021-04-05 PROCEDURE — 59025 FETAL NON-STRESS TEST: CPT | Performed by: OBSTETRICS & GYNECOLOGY

## 2021-04-08 ENCOUNTER — APPOINTMENT (OUTPATIENT)
Dept: RADIOLOGY | Facility: IMAGING CENTER | Age: 36
End: 2021-04-08
Attending: NURSE PRACTITIONER

## 2021-04-08 ENCOUNTER — NON-PROVIDER VISIT (OUTPATIENT)
Dept: OBGYN | Facility: CLINIC | Age: 36
End: 2021-04-08

## 2021-04-08 ENCOUNTER — ROUTINE PRENATAL (OUTPATIENT)
Dept: OBGYN | Facility: CLINIC | Age: 36
End: 2021-04-08

## 2021-04-08 VITALS — SYSTOLIC BLOOD PRESSURE: 107 MMHG | BODY MASS INDEX: 33.4 KG/M2 | WEIGHT: 194.6 LBS | DIASTOLIC BLOOD PRESSURE: 63 MMHG

## 2021-04-08 DIAGNOSIS — O09.529 ENCOUNTER FOR SUPERVISION OF HIGH-RISK PREGNANCY WITH MULTIGRAVIDA OF ADVANCED MATERNAL AGE: ICD-10-CM

## 2021-04-08 DIAGNOSIS — R73.09 ABNORMAL GTT (GLUCOSE TOLERANCE TEST): ICD-10-CM

## 2021-04-08 PROCEDURE — 76816 OB US FOLLOW-UP PER FETUS: CPT | Mod: TC | Performed by: NURSE PRACTITIONER

## 2021-04-08 PROCEDURE — 90040 PR PRENATAL FOLLOW UP: CPT | Performed by: PHYSICIAN ASSISTANT

## 2021-04-08 PROCEDURE — 76820 UMBILICAL ARTERY ECHO: CPT | Mod: TC | Performed by: NURSE PRACTITIONER

## 2021-04-08 NOTE — PROGRESS NOTES
Pt. Here for OB/FU and NST. Reports Good FM.   Good # [phone]  Pt. States no complaints.    Pharmacy verified.   Chaperone offered and not needed.   GBS negative

## 2021-04-08 NOTE — PROGRESS NOTES
Pt has no complaints with cramping, UCs, Vb, LOF, though pt has had irreg UCs over the past week. No f/u with Dr Alvarado, but pt was told she needs IOL by 5/3, though that 10 days after CROW. Will send referral for IOL from 4/23 to 5/1 and pt informed we can adjust date prn. +FM. GBS neg - pt informed of results. RTC 1 wk or sooner prn.

## 2021-04-09 ENCOUNTER — HOSPITAL ENCOUNTER (EMERGENCY)
Facility: MEDICAL CENTER | Age: 36
End: 2021-04-09
Attending: OBSTETRICS & GYNECOLOGY | Admitting: OBSTETRICS & GYNECOLOGY
Payer: MEDICAID

## 2021-04-09 ENCOUNTER — TELEPHONE (OUTPATIENT)
Dept: OBGYN | Facility: CLINIC | Age: 36
End: 2021-04-09

## 2021-04-09 VITALS
RESPIRATION RATE: 16 BRPM | BODY MASS INDEX: 33.3 KG/M2 | SYSTOLIC BLOOD PRESSURE: 114 MMHG | HEART RATE: 86 BPM | DIASTOLIC BLOOD PRESSURE: 70 MMHG | TEMPERATURE: 98.6 F | OXYGEN SATURATION: 93 % | WEIGHT: 194 LBS

## 2021-04-09 PROCEDURE — 59025 FETAL NON-STRESS TEST: CPT

## 2021-04-09 PROCEDURE — 302449 STATCHG TRIAGE ONLY (STATISTIC)

## 2021-04-09 ASSESSMENT — PAIN SCALES - GENERAL: PAINLEVEL: 0 - NO PAIN

## 2021-04-09 NOTE — PROGRESS NOTES
Pt presents to L&D for evaluation and possible IOL due to IUGR. Pt ambulated to S223 for assessment.     1420 TOCO and EFM applied, VSS. Pt reports +FM, denies LOF or VB. POC discussed.     1445 Dr. Palomo updated on pt status.     1520 Dr. Palomo at bedside, POC discussed. Pt requesting a follow up US for confirm diagnosis.     1545 Report given to Faith GARCIA, POC discussed.

## 2021-04-09 NOTE — TELEPHONE ENCOUNTER
"Per  I called pt to let her know she needs to head to the hospital to deliver due to baby being to small. Pt asked what were the consequences if she were to not go today. I asked doctor and she said baby could pass away. Pt understood and states \"oh my god\". She will be heading over as soon as she gets her bag ready and waits for .  "

## 2021-04-09 NOTE — H&P
[facility] LABOR AND DELIVERY HISTORY AND PHYSICAL    PATIENT ID:  NAME:  Lindsay Flanagan  MRN:               [id number]  YOB: 1985    CC:  Possible IOL 2/2 IUGR    HPI:  Lindsay Flanagan is a 35 y.o. female  at 38w0d by a 12 week ultrasound performed on 20, consistent with LMP on Patient's last menstrual period was 2020 (exact date).. Estimated Date of Delivery: 21  Patient presents complaining of intermittent uterine contractions, with no loss of fluid.  Normal fetal movement.  Denies vaginal bleeding.  Pregnancy was complicated by IUGR based on U/S from 21.    ROS: Patient denies any fever chills, nausea, vomiting, headache, chest pain, shortness of breath, or dysuria or unusual swelling of hands or feet.     Prenatal Care: Obtained at [facility], 1st visit 10/29/20 with 9 total visits.  Third trimester BPs were approximately 104-118/60-64.  Total weight gain 10 kg during the pregnancy.    Prenatal Labs:   HepBsAg: NR HIV: NR Rubella: Immune   RPR: NR PAP: Negative GBS: Negative   GC/CT: Negative O+/ Ab neg Quad Screen: None   No results for input(s): WBC, RBC, HEMOGLOBIN, HEMATOCRIT, MCV, MCH, RDW, PLATELETCT, MPV, NEUTSPOLYS, LYMPHOCYTES, MONOCYTES, EOSINOPHILS, BASOPHILS, RBCMORPHOLO in the last 72 hours.  No results for input(s): SODIUM, POTASSIUM, CHLORIDE, CO2, GLUCOSE, BUN, CPKTOTAL in the last 72 hours.       IMAGING:  Pending U/S with Dr Jenny EDGE Hx:  OB History    Para Term  AB Living   2 1 1     1   SAB TAB Ectopic Molar Multiple Live Births             1      # Outcome Date GA Lbr Rafael/2nd Weight Sex Delivery Anes PTL Lv   2 Current            1 Term 13 40w0d  3.175 kg (7 lb) F Vag-Spont  N MICHAEL       PMH/Problem List:    Past Medical History:   Diagnosis Date   • Urinary tract infection, site not specified      Patient Active Problem List    Diagnosis Date Noted   • History of birth trauma 2021   • History of shoulder dystocia x  20 sec in prior pregnancy 2021   • Abnormal fetal ultrasound - no f/u with Dr Alvarado, needs PP  echo 2020   • Encounter for supervision of high-risk pregnancy with multigravida of advanced maternal age 2020       Current Outpatient Medications:  No current facility-administered medications on file prior to encounter.     Current Outpatient Medications on File Prior to Encounter   Medication Sig Dispense Refill   • Prenatal MV-Min-Fe Fum-FA-DHA (PRENATAL 1 PO) Take  by mouth.         PSH:    Past Surgical History:   Procedure Laterality Date   • APPENDECTOMY     • OTHER ABDOMINAL SURGERY         Allergies:   Allergies   Allergen Reactions   • Nkda [No Known Drug Allergy]        SH:  Social History     Socioeconomic History   • Marital status:      Spouse name: Not on file   • Number of children: Not on file   • Years of education: Not on file   • Highest education level: Not on file   Occupational History   • Not on file   Tobacco Use   • Smoking status: Never Smoker   • Smokeless tobacco: Never Used   Substance and Sexual Activity   • Alcohol use: No   • Drug use: Never   • Sexual activity: Yes     Partners: Male     Comment: None   Other Topics Concern   • Not on file   Social History Narrative   • Not on file     Social Determinants of Health     Financial Resource Strain:    • Difficulty of Paying Living Expenses:    Food Insecurity:    • Worried About Running Out of Food in the Last Year:    • Ran Out of Food in the Last Year:    Transportation Needs:    • Lack of Transportation (Medical):    • Lack of Transportation (Non-Medical):    Physical Activity:    • Days of Exercise per Week:    • Minutes of Exercise per Session:    Stress:    • Feeling of Stress :    Social Connections:    • Frequency of Communication with Friends and Family:    • Frequency of Social Gatherings with Friends and Family:    • Attends [employer] Services:    • Active Member of Clubs or Organizations:    •  Attends Club or Organization Meetings:    • Marital Status:    Intimate Partner Violence:    • Fear of Current or Ex-Partner:    • Emotionally Abused:    • Physically Abused:    • Sexually Abused:          PHYSICAL EXAM:  Vitals:    21 1421 21 1422 21 1438   BP: 114/70     Pulse: 89 86    Resp:   16   Temp:   36.5 °C (97.7 °F)   TempSrc:   Temporal   SpO2:  93%    Weight:  88 kg (194 lb)      Temp (24hrs), Av.5 °C (97.7 °F), Min:36.5 °C (97.7 °F), Max:36.5 °C (97.7 °F)    General: No acute distress, resting comfortably in bed.  HEENT: normocephalic, nontraumatic, PERRLA, EOMI  Cardiovascular: Heart RRR with no murmurs, rubs or gallops. Distal Pulses 2+  Respiratory: symmetric chest expansion, lungs CTA bilaterally with no wheezes rales or rhonci  Abdomen: gravid, nontender  Musculoskeletal: strength 5/5 in four extremities  Neuro: non focal with no numbness, tingling or changes in sensation    SVE: 3/50%/ballotable  [place]: Intermittent; EFM: 150 with accels to 180    A/P: Intrauterine pregancy at 38w0d weeks here for follow up from U/S on 21 that demonstrated IUGR with EFW 2.7%.    1. IUP at term  2. Based on U/S performed yesterday, infant meets criteria for IUGR.  3. We will contact Dr Alvarado to consult for repeat U/S to verify IUGR status of fetus. If confirmed, patient to be admitted for IOL.

## 2021-04-09 NOTE — PROGRESS NOTES
1545 Bedside report received from Elizabeth MCCARTHY RN. POC reviewed. Awaiting US by Dr. Alvarado.    1855 Bedside report given to Chely GARCIA. POC reviewed.

## 2021-04-10 NOTE — PROGRESS NOTES
1900 Report received from OSCAR Rodríguez RN,   Assumed care of pt    2125 Jenny HUANG at bedside for Ultrasound.     2208 Orders given to discharge pt. Per Dr. Palomo.     2215 Discharge instructions given and discussed, signed copy in chart. Pt verbalized understanding and all questions answered. Via  (Irasema) [phone].

## 2021-04-12 ENCOUNTER — ROUTINE PRENATAL (OUTPATIENT)
Dept: OBGYN | Facility: CLINIC | Age: 36
End: 2021-04-12

## 2021-04-12 ENCOUNTER — HOSPITAL ENCOUNTER (OUTPATIENT)
Facility: MEDICAL CENTER | Age: 36
End: 2021-04-12
Attending: NURSE PRACTITIONER
Payer: COMMERCIAL

## 2021-04-12 VITALS — SYSTOLIC BLOOD PRESSURE: 126 MMHG | WEIGHT: 193.8 LBS | BODY MASS INDEX: 33.27 KG/M2 | DIASTOLIC BLOOD PRESSURE: 69 MMHG

## 2021-04-12 DIAGNOSIS — N89.8 VAGINAL ITCHING: ICD-10-CM

## 2021-04-12 DIAGNOSIS — O09.529 ENCOUNTER FOR SUPERVISION OF HIGH-RISK PREGNANCY WITH MULTIGRAVIDA OF ADVANCED MATERNAL AGE: ICD-10-CM

## 2021-04-12 DIAGNOSIS — O09.529 ENCOUNTER FOR SUPERVISION OF HIGH-RISK PREGNANCY WITH MULTIGRAVIDA OF ADVANCED MATERNAL AGE: Primary | ICD-10-CM

## 2021-04-12 PROCEDURE — 90040 PR PRENATAL FOLLOW UP: CPT | Performed by: NURSE PRACTITIONER

## 2021-04-12 NOTE — PROGRESS NOTES
"S:  Pt is  at 38w3d here for routine OB follow up.  C/o vaginal itching.  Reports good FM.  Denies VB, LOF, RUCs. Denies cough, SOB, sore throat or fever.      Pt had NST today.  Was told her baby is small and needs an IOL on 21.  Was seen in the hospital where she had an ultrasound done by Dr. Alvarado.  They sent home saying her CROW was incorrect and that her baby was fine.  They recommended for her to wait for her induction.      She is worried about her baby being in the incubator. She is also concerned about inducing her labor as she had a bad experience before.  She does not want to be induced.  Says she's confused about hearing different things from different people.      Does not understand why Dr. Alvarado would tell her that everything is OK with her baby and that she should wait for her induction.  She asks for her partner to come in to help her ask questions.      Partner says that Jenny told them that his ultrasound was the most accurate and to not be \"tricked\" into getting induced early.  Says that we don't know what we are doing.       Wanted us to speak to Dr. Alvarado today and let him know about the earlier ultrasound done at 12wks.        O:    Vitals:    21 1413   BP: 126/69   Weight: 87.9 kg (193 lb 12.8 oz)           FHTs: 135        Fundal ht: 35        Fetal position: vertex        SVE: deferred        GBS neg on 3/29/21 -- reviewed w pt.      Limited OB US     2021 3:31 PM     HISTORY/REASON FOR EXAM:  AMA, follow-up growth     TECHNIQUE/EXAM DESCRIPTION: OB limited ultrasound.     COMPARISON:  Obstetrical ultrasound 12/10/2020     FINDINGS:  Fetal Lie:  Vertex  LMP:  2020  Clinical CROW by LMP:  2021     Placenta (Location):  Anterior  Placenta Previa: No  Placental Grade: II     Amniotic Fluid Volume:  RENAN = 16.36 cm     Fetal Heart Rate:  148 bpm     No maternal adnexal mass is identified.     Umbilical Artery S/D Ratio(s):  2.10, 2.06, 2.19     Fetal Biometry  BPD    8.69 " cm, 35 weeks, 1 day, (7%)  HC    31.24 cm, 35 weeks, (Less than 1%)  AC    30.04 cm, 34 weeks, (Less than 1%)  Femur Length    6.63 cm, 34 weeks, 1 day, (Less than 1%)  Humerus Length    5.99 cm, 34 weeks, 5 days, (11.25%)     EGA by this US:  34 weeks, 4 days  CROW by this US: 2021, previously 5/3/2021  CROW by 1st US:  2021     Estimated Fetal Weight:  2384 grams  EFW Percentile: 2.7%     Comments:     IMPRESSION:     Single intrauterine pregnancy of an estimated gestational age of 34 weeks, 4 days with an estimated date of delivery of 2021. Size is less than dates       A:  IUP at 38w3d  Patient Active Problem List    Diagnosis Date Noted   • History of birth trauma 2021   • History of shoulder dystocia x 20 sec in prior pregnancy 2021   • Abnormal fetal ultrasound - no f/u with Dr Alvarado, needs PP  echo 2020   • Encounter for supervision of high-risk pregnancy with multigravida of advanced maternal age 2020       P:  1.  Continue FKCs.         2.  Reviewed US findings with patient.  D/w pt in great detail about her dating.  Reviewed that she had an early 1st trimester ultrasound with us.         3.  IOL recommended for tomorrow 21 - pt and partner refuse.  Refusal of care signed.  RBA d/w pt about declining induction of labor, including fetal demise.        4.  Questions answered.         5.  Encouraged adequate water intake       6.  F/u 1wk w MDs, cont 2x/wk NSTs       7.  Vaginal pathogen swab done.    Chaperone offered and provided by Ela Haile MA.  DARREN Haile also translated this visit.   Consulted w Dr. Frausto regarding pt and POC.

## 2021-04-12 NOTE — PROGRESS NOTES
Pt. Here for OB/FU. Reports Good FM.   Good # [phone]  Pt. Denies VB, LOF, or UC's.   Pharmacy verified.   Chaperone offered and not indicated  Pt states that she has some green discharge and itching  GBS negative

## 2021-04-12 NOTE — PATIENT INSTRUCTIONS
P:  1.  Continue FKCs.         2.  Reviewed US findings with patient.  D/w pt in great detail about her dating.  Reviewed that she had an early 1st trimester ultrasound with us.         3.  IOL recommended for tomorrow 4/13/21 - pt and partner refuse.  Refusal of care signed.  RBA d/w pt about declining induction of labor, including fetal demise.        4.  Questions answered.         5.  Encouraged adequate water intake       6.  F/u 1wk w MDs, cont 2x/wk NSTs       7.  Vaginal pathogen swab done.

## 2021-04-13 NOTE — PROGRESS NOTES
Consulted by CNM regarding management given recent ultrasound findings of fetal growth restriction.    On review of patient's chart, patient is dated with an CROW 4/23 by sure LMP consistent with a 12-week ultrasound done here in our office.  Ultrasound read by radiology performed 4/8 shows pregnancy with abdominal circumference less than the 1st percentile and estimated fetal weight 2.7%.  Umbilical artery Dopplers performed at that time were normal.    A consultation previously in this pregnancy with Dr. Alvarado after pt was referred for fetal growth restriction noted on anatomy ultrasound, revealed a fetus around 23 weeks (1/4) with an estimated due date 5/1.  It was recommended at that time that the patient's due date be changed to 5/3 which is what the patient informed Dr. Alvarado her due date was, although it is unclear where this date came from.  Per ACOG guidelines, even without the 12-week ultrasound that 23-week ultrasound should not have change the patient's estimated due date as it was not greater than 14 days different from dating by last menstrual period.  Also of note, if we did use an estimated due date as by Dr. Alvarado's ultrasound, 5/1, the recent growth ultrasound 4/8 with still have fetal growth restriction.  The fetus at that time would have been 36 weeks 5 days with EFW 7% and abdominal circumference 3.7%.    Given the first trimester dating US c/w LMP and the concern for severe fetal growth restriction I would recommend induction of labor in concordance with [facility] recommendations.  On chart review, from attending when patient was seen 4/9 in hospital documents that the patient desired an additional ultrasound by Dr. Alvarado prior to possible induction.  That note states that Dr. Alvarado did ultrasound the patient and found AGA fetus measuring consistent with a CROW 5/3 no ultrasound report or consultation note is yet available from maternal-fetal medicine.    The patient did have a reactive NST today, I spoke with  labor and delivery myself and scheduled the patient for 8 AM induction of labor tomorrow.    I was informed later by the primary midwife seeing the patient today that the patient subsequently refused induction of labor and has signed a form stating she understands she is going AGAINST MEDICAL ADVICE in doing so but is willing to return for NST.    Zhanna Porter MD  [facility], Women's Health

## 2021-04-15 ENCOUNTER — ROUTINE PRENATAL (OUTPATIENT)
Dept: OBGYN | Facility: CLINIC | Age: 36
End: 2021-04-15

## 2021-04-15 DIAGNOSIS — O28.3 ABNORMAL FETAL ULTRASOUND: ICD-10-CM

## 2021-04-15 DIAGNOSIS — O36.5930 POOR FETAL GROWTH AFFECTING MANAGEMENT OF MOTHER IN THIRD TRIMESTER, SINGLE OR UNSPECIFIED FETUS: ICD-10-CM

## 2021-04-15 PROCEDURE — 59025 FETAL NON-STRESS TEST: CPT | Performed by: PHYSICIAN ASSISTANT

## 2021-04-19 ENCOUNTER — ROUTINE PRENATAL (OUTPATIENT)
Dept: OBGYN | Facility: CLINIC | Age: 36
End: 2021-04-19

## 2021-04-19 VITALS — SYSTOLIC BLOOD PRESSURE: 103 MMHG | DIASTOLIC BLOOD PRESSURE: 63 MMHG | WEIGHT: 197 LBS | BODY MASS INDEX: 33.81 KG/M2

## 2021-04-19 DIAGNOSIS — Z87.59 HISTORY OF SHOULDER DYSTOCIA IN PRIOR PREGNANCY: ICD-10-CM

## 2021-04-19 DIAGNOSIS — O36.5930 POOR FETAL GROWTH AFFECTING MANAGEMENT OF MOTHER IN THIRD TRIMESTER, SINGLE OR UNSPECIFIED FETUS: ICD-10-CM

## 2021-04-19 DIAGNOSIS — Z87.898 HISTORY OF BIRTH TRAUMA: ICD-10-CM

## 2021-04-19 DIAGNOSIS — O36.5990 POOR FETAL GROWTH AFFECTING MANAGEMENT OF MOTHER, ANTEPARTUM, SINGLE OR UNSPECIFIED FETUS: ICD-10-CM

## 2021-04-19 DIAGNOSIS — O28.3 ABNORMAL FETAL ULTRASOUND: ICD-10-CM

## 2021-04-19 DIAGNOSIS — O09.529 ENCOUNTER FOR SUPERVISION OF HIGH-RISK PREGNANCY WITH MULTIGRAVIDA OF ADVANCED MATERNAL AGE: ICD-10-CM

## 2021-04-19 PROCEDURE — 90040 PR PRENATAL FOLLOW UP: CPT | Mod: 25 | Performed by: OBSTETRICS & GYNECOLOGY

## 2021-04-19 PROCEDURE — 59025 FETAL NON-STRESS TEST: CPT | Performed by: OBSTETRICS & GYNECOLOGY

## 2021-04-19 NOTE — PROGRESS NOTES
OB follow up   + fetal movement.  No VB, LOF or UC's.  Phone # [phone] (home)   Preferred pharmacy confirmed.

## 2021-04-19 NOTE — PROGRESS NOTES
Lindsay Flanagan, a  at 39w3d with an CROW of 2021, by Ultrasound, was seen at [facility] for a nonstress test.    Nonstress Test  Reason for NST: Intrauterine growth restriction  Variability: Moderate  Decelerations: None  Accelerations: Yes  Acoustic Stimulator: No  Baseline: 140  Uterine Irritability: No  Contractions: Not present  Nonstress Test Interpretation  Comments: Reactive NST per my read-Dr. Salas

## 2021-04-19 NOTE — PROGRESS NOTES
S: Pt presents for routine OB follow up.  No contractions, vaginal bleeding, or leaking fluids. Good fetal movement.      O: /63   Wt 89.4 kg (197 lb)   LMP 2020 (Exact Date)   BMI 33.81 kg/m²   Vitals:    21 0911   BP: 103/63   Weight: 89.4 kg (197 lb)     Vitals, fundal height , fetal position, and FHR reviewed on flowsheet    Patient Active Problem List   Diagnosis   • Encounter for supervision of high-risk pregnancy with multigravida of advanced maternal age   • Abnormal fetal ultrasound - no f/u with Dr Alvarado, needs PP  echo   • History of birth trauma   • History of shoulder dystocia x 20 sec in prior pregnancy       Lab:  Recent Labs     20  0949   ABOGROUP O   RUBELLAIGG 107.00   HEPBSAG Non-Reactive   HEPCAB Non-Reactive       A/P:  35 y.o.  at 39w3d presents for routine obstetric follow-up.     #Prenatal care  - Continue prenatal vitamins.  Pap and GCCT neg; GIRL - Alena  AFP neg  US abnormal > referral placed to perinatology - Jenny says AFO only, rec'd  echo - no f/u indicated  Severe IUGR -again addressed with patient recommendation for induction of labor given severe IUGR.  Patient reports understanding and that she has been counseled on this previously.  She declines induction of labor at this time and desires twice weekly NSTs. Understands this is against medical advise and there is risk of fetal demise or other fetal compromise. Is amenable for her induction scheduled 21.    1hr GTT abnormal > 3hr wnl.   Flu and tdap done.

## 2021-04-20 ENCOUNTER — HOSPITAL ENCOUNTER (INPATIENT)
Facility: MEDICAL CENTER | Age: 36
LOS: 1 days | End: 2021-04-21
Attending: OBSTETRICS & GYNECOLOGY | Admitting: OBSTETRICS & GYNECOLOGY
Payer: MEDICAID

## 2021-04-20 LAB
BASOPHILS # BLD AUTO: 0.2 % (ref 0–1.8)
BASOPHILS # BLD: 0.02 K/UL (ref 0–0.12)
EOSINOPHIL # BLD AUTO: 0.04 K/UL (ref 0–0.51)
EOSINOPHIL NFR BLD: 0.4 % (ref 0–6.9)
ERYTHROCYTE [DISTWIDTH] IN BLOOD BY AUTOMATED COUNT: 44.9 FL (ref 35.9–50)
ERYTHROCYTE [DISTWIDTH] IN BLOOD BY AUTOMATED COUNT: 45 FL (ref 35.9–50)
HCT VFR BLD AUTO: 32.7 % (ref 37–47)
HCT VFR BLD AUTO: 37.7 % (ref 37–47)
HGB BLD-MCNC: 10.6 G/DL (ref 12–16)
HGB BLD-MCNC: 12.5 G/DL (ref 12–16)
HOLDING TUBE BB 8507: NORMAL
IMM GRANULOCYTES # BLD AUTO: 0.05 K/UL (ref 0–0.11)
IMM GRANULOCYTES NFR BLD AUTO: 0.6 % (ref 0–0.9)
LYMPHOCYTES # BLD AUTO: 1.17 K/UL (ref 1–4.8)
LYMPHOCYTES NFR BLD: 13.1 % (ref 22–41)
MCH RBC QN AUTO: 28.2 PG (ref 27–33)
MCH RBC QN AUTO: 28.7 PG (ref 27–33)
MCHC RBC AUTO-ENTMCNC: 32.4 G/DL (ref 33.6–35)
MCHC RBC AUTO-ENTMCNC: 33.2 G/DL (ref 33.6–35)
MCV RBC AUTO: 86.5 FL (ref 81.4–97.8)
MCV RBC AUTO: 87 FL (ref 81.4–97.8)
MONOCYTES # BLD AUTO: 0.48 K/UL (ref 0–0.85)
MONOCYTES NFR BLD AUTO: 5.4 % (ref 0–13.4)
NEUTROPHILS # BLD AUTO: 7.19 K/UL (ref 2–7.15)
NEUTROPHILS NFR BLD: 80.3 % (ref 44–72)
NRBC # BLD AUTO: 0 K/UL
NRBC BLD-RTO: 0 /100 WBC
PLATELET # BLD AUTO: 199 K/UL (ref 164–446)
PLATELET # BLD AUTO: 203 K/UL (ref 164–446)
PMV BLD AUTO: 10.5 FL (ref 9–12.9)
PMV BLD AUTO: 10.6 FL (ref 9–12.9)
RBC # BLD AUTO: 3.76 M/UL (ref 4.2–5.4)
RBC # BLD AUTO: 4.36 M/UL (ref 4.2–5.4)
SARS-COV-2 RNA RESP QL NAA+PROBE: NOTDETECTED
SPECIMEN SOURCE: NORMAL
WBC # BLD AUTO: 13.6 K/UL (ref 4.8–10.8)
WBC # BLD AUTO: 9 K/UL (ref 4.8–10.8)

## 2021-04-20 PROCEDURE — A9270 NON-COVERED ITEM OR SERVICE: HCPCS | Performed by: STUDENT IN AN ORGANIZED HEALTH CARE EDUCATION/TRAINING PROGRAM

## 2021-04-20 PROCEDURE — 85025 COMPLETE CBC W/AUTO DIFF WBC: CPT

## 2021-04-20 PROCEDURE — 59409 OBSTETRICAL CARE: CPT

## 2021-04-20 PROCEDURE — 700111 HCHG RX REV CODE 636 W/ 250 OVERRIDE (IP)

## 2021-04-20 PROCEDURE — 36415 COLL VENOUS BLD VENIPUNCTURE: CPT

## 2021-04-20 PROCEDURE — 700102 HCHG RX REV CODE 250 W/ 637 OVERRIDE(OP): Performed by: STUDENT IN AN ORGANIZED HEALTH CARE EDUCATION/TRAINING PROGRAM

## 2021-04-20 PROCEDURE — 59409 OBSTETRICAL CARE: CPT | Performed by: OBSTETRICS & GYNECOLOGY

## 2021-04-20 PROCEDURE — 700111 HCHG RX REV CODE 636 W/ 250 OVERRIDE (IP): Performed by: STUDENT IN AN ORGANIZED HEALTH CARE EDUCATION/TRAINING PROGRAM

## 2021-04-20 PROCEDURE — 0KQM0ZZ REPAIR PERINEUM MUSCLE, OPEN APPROACH: ICD-10-PCS | Performed by: OBSTETRICS & GYNECOLOGY

## 2021-04-20 PROCEDURE — 302449 STATCHG TRIAGE ONLY (STATISTIC)

## 2021-04-20 PROCEDURE — 700105 HCHG RX REV CODE 258: Performed by: OBSTETRICS & GYNECOLOGY

## 2021-04-20 PROCEDURE — 770002 HCHG ROOM/CARE - OB PRIVATE (112)

## 2021-04-20 PROCEDURE — 304965 HCHG RECOVERY SERVICES

## 2021-04-20 PROCEDURE — U0005 INFEC AGEN DETEC AMPLI PROBE: HCPCS

## 2021-04-20 PROCEDURE — 85027 COMPLETE CBC AUTOMATED: CPT

## 2021-04-20 PROCEDURE — U0003 INFECTIOUS AGENT DETECTION BY NUCLEIC ACID (DNA OR RNA); SEVERE ACUTE RESPIRATORY SYNDROME CORONAVIRUS 2 (SARS-COV-2) (CORONAVIRUS DISEASE [COVID-19]), AMPLIFIED PROBE TECHNIQUE, MAKING USE OF HIGH THROUGHPUT TECHNOLOGIES AS DESCRIBED BY CMS-2020-01-R: HCPCS

## 2021-04-20 RX ORDER — IBUPROFEN 800 MG/1
800 TABLET ORAL EVERY 6 HOURS PRN
Status: DISCONTINUED | OUTPATIENT
Start: 2021-04-20 | End: 2021-04-21 | Stop reason: HOSPADM

## 2021-04-20 RX ORDER — BISACODYL 10 MG
10 SUPPOSITORY, RECTAL RECTAL PRN
Status: DISCONTINUED | OUTPATIENT
Start: 2021-04-20 | End: 2021-04-21 | Stop reason: HOSPADM

## 2021-04-20 RX ORDER — SODIUM CHLORIDE, SODIUM LACTATE, POTASSIUM CHLORIDE, CALCIUM CHLORIDE 600; 310; 30; 20 MG/100ML; MG/100ML; MG/100ML; MG/100ML
INJECTION, SOLUTION INTRAVENOUS CONTINUOUS
Status: ACTIVE | OUTPATIENT
Start: 2021-04-20 | End: 2021-04-20

## 2021-04-20 RX ORDER — METHYLERGONOVINE MALEATE 0.2 MG/ML
0.2 INJECTION INTRAVENOUS
Status: DISCONTINUED | OUTPATIENT
Start: 2021-04-20 | End: 2021-04-20 | Stop reason: HOSPADM

## 2021-04-20 RX ORDER — SODIUM CHLORIDE, SODIUM LACTATE, POTASSIUM CHLORIDE, CALCIUM CHLORIDE 600; 310; 30; 20 MG/100ML; MG/100ML; MG/100ML; MG/100ML
INJECTION, SOLUTION INTRAVENOUS PRN
Status: DISCONTINUED | OUTPATIENT
Start: 2021-04-20 | End: 2021-04-21 | Stop reason: HOSPADM

## 2021-04-20 RX ORDER — MISOPROSTOL 200 UG/1
600 TABLET ORAL
Status: DISCONTINUED | OUTPATIENT
Start: 2021-04-20 | End: 2021-04-21 | Stop reason: HOSPADM

## 2021-04-20 RX ORDER — METHYLERGONOVINE MALEATE 0.2 MG/ML
0.2 INJECTION INTRAVENOUS
Status: DISCONTINUED | OUTPATIENT
Start: 2021-04-20 | End: 2021-04-21 | Stop reason: HOSPADM

## 2021-04-20 RX ORDER — DOCUSATE SODIUM 100 MG/1
100 CAPSULE, LIQUID FILLED ORAL 2 TIMES DAILY PRN
Status: DISCONTINUED | OUTPATIENT
Start: 2021-04-20 | End: 2021-04-21 | Stop reason: HOSPADM

## 2021-04-20 RX ORDER — IBUPROFEN 800 MG/1
800 TABLET ORAL EVERY 8 HOURS PRN
Status: DISCONTINUED | OUTPATIENT
Start: 2021-04-20 | End: 2021-04-20

## 2021-04-20 RX ORDER — ACETAMINOPHEN 325 MG/1
650 TABLET ORAL EVERY 4 HOURS PRN
Status: DISCONTINUED | OUTPATIENT
Start: 2021-04-20 | End: 2021-04-20

## 2021-04-20 RX ORDER — LIDOCAINE HYDROCHLORIDE 10 MG/ML
INJECTION, SOLUTION INFILTRATION; PERINEURAL
Status: ACTIVE
Start: 2021-04-20 | End: 2021-04-20

## 2021-04-20 RX ORDER — ACETAMINOPHEN 325 MG/1
650 TABLET ORAL EVERY 4 HOURS PRN
Status: DISCONTINUED | OUTPATIENT
Start: 2021-04-20 | End: 2021-04-21 | Stop reason: HOSPADM

## 2021-04-20 RX ORDER — ONDANSETRON 4 MG/1
4 TABLET, ORALLY DISINTEGRATING ORAL EVERY 6 HOURS PRN
Status: DISCONTINUED | OUTPATIENT
Start: 2021-04-20 | End: 2021-04-21 | Stop reason: HOSPADM

## 2021-04-20 RX ORDER — ONDANSETRON 2 MG/ML
4 INJECTION INTRAMUSCULAR; INTRAVENOUS EVERY 6 HOURS PRN
Status: DISCONTINUED | OUTPATIENT
Start: 2021-04-20 | End: 2021-04-21 | Stop reason: HOSPADM

## 2021-04-20 RX ORDER — MISOPROSTOL 200 UG/1
800 TABLET ORAL
Status: DISCONTINUED | OUTPATIENT
Start: 2021-04-20 | End: 2021-04-20 | Stop reason: HOSPADM

## 2021-04-20 RX ORDER — VITAMIN A ACETATE, BETA CAROTENE, ASCORBIC ACID, CHOLECALCIFEROL, .ALPHA.-TOCOPHEROL ACETATE, DL-, THIAMINE MONONITRATE, RIBOFLAVIN, NIACINAMIDE, PYRIDOXINE HYDROCHLORIDE, FOLIC ACID, CYANOCOBALAMIN, CALCIUM CARBONATE, FERROUS FUMARATE, ZINC OXIDE, CUPRIC OXIDE 3080; 12; 120; 400; 1; 1.84; 3; 20; 22; 920; 25; 200; 27; 10; 2 [IU]/1; UG/1; MG/1; [IU]/1; MG/1; MG/1; MG/1; MG/1; MG/1; [IU]/1; MG/1; MG/1; MG/1; MG/1; MG/1
1 TABLET, FILM COATED ORAL
Status: DISCONTINUED | OUTPATIENT
Start: 2021-04-20 | End: 2021-04-21 | Stop reason: HOSPADM

## 2021-04-20 RX ADMIN — IBUPROFEN 800 MG: 800 TABLET, FILM COATED ORAL at 13:15

## 2021-04-20 RX ADMIN — SODIUM CHLORIDE, POTASSIUM CHLORIDE, SODIUM LACTATE AND CALCIUM CHLORIDE: 600; 310; 30; 20 INJECTION, SOLUTION INTRAVENOUS at 08:27

## 2021-04-20 RX ADMIN — OXYTOCIN 125 ML/HR: 10 INJECTION, SOLUTION INTRAMUSCULAR; INTRAVENOUS at 11:16

## 2021-04-20 RX ADMIN — FENTANYL CITRATE 100 MCG: 50 INJECTION, SOLUTION INTRAMUSCULAR; INTRAVENOUS at 08:25

## 2021-04-20 RX ADMIN — PRENATAL WITH FERROUS FUM AND FOLIC ACID 1 TABLET: 3080; 920; 120; 400; 22; 1.84; 3; 20; 10; 1; 12; 200; 27; 25; 2 TABLET ORAL at 10:58

## 2021-04-20 ASSESSMENT — LIFESTYLE VARIABLES
EVER HAD A DRINK FIRST THING IN THE MORNING TO STEADY YOUR NERVES TO GET RID OF A HANGOVER: NO
TOTAL SCORE: 0
TOTAL SCORE: 0
EVER FELT BAD OR GUILTY ABOUT YOUR DRINKING: NO
HAVE YOU EVER FELT YOU SHOULD CUT DOWN ON YOUR DRINKING: NO
ALCOHOL_USE: NO
HOW MANY TIMES IN THE PAST YEAR HAVE YOU HAD 5 OR MORE DRINKS IN A DAY: 0
DOES PATIENT WANT TO STOP DRINKING: NO
ON A TYPICAL DAY WHEN YOU DRINK ALCOHOL HOW MANY DRINKS DO YOU HAVE: 0
AVERAGE NUMBER OF DAYS PER WEEK YOU HAVE A DRINK CONTAINING ALCOHOL: 0
TOTAL SCORE: 0
EVER_SMOKED: NEVER
HAVE PEOPLE ANNOYED YOU BY CRITICIZING YOUR DRINKING: NO
CONSUMPTION TOTAL: NEGATIVE

## 2021-04-20 ASSESSMENT — PAIN DESCRIPTION - PAIN TYPE: TYPE: ACUTE PAIN

## 2021-04-20 ASSESSMENT — PAIN SCALES - GENERAL: PAINLEVEL: 5 - MODERATE PAIN

## 2021-04-20 ASSESSMENT — PATIENT HEALTH QUESTIONNAIRE - PHQ9
2. FEELING DOWN, DEPRESSED, IRRITABLE, OR HOPELESS: NOT AT ALL
1. LITTLE INTEREST OR PLEASURE IN DOING THINGS: NOT AT ALL
SUM OF ALL RESPONSES TO PHQ9 QUESTIONS 1 AND 2: 0

## 2021-04-20 NOTE — CARE PLAN
Problem: Communication  Goal: The ability to communicate needs accurately and effectively will improve  Outcome: PROGRESSING AS EXPECTED  Note: Educated pt on POC, medications, and answered any questions the pt had. Reinforced the use of the call light. Encouraged pt to voice any concerns or needs.  services offered.  Will continue to monitor.        Problem: Pain Management  Goal: Pain level will decrease to patient's comfort goal  Outcome: PROGRESSING AS EXPECTED  Flowsheets (Taken 4/20/2021 1040)  Comfort Goal:   Comfort with Movement   Perform Activity  Note: Pt educated on non-pharmacologic pain management measures. PRN pain medication available. Continuously assessing pts pain rating and need for intervention.

## 2021-04-20 NOTE — LACTATION NOTE
"This note was copied from a baby's chart.  Mother reports that she is breast feeding her . She said that she breast fed her first child and was \"fine\", but then did admit that she had experienced sore nipples. I requested that she call for assistance if her nipples are feeling sore this time  While nursing Celina so that we may offer her some guidance and potentially avoid any nipple damage. She agreed.    She says that she does have WIC services in [place].  "

## 2021-04-20 NOTE — H&P
History and Physical    Lindsay Flanagan is a 35 y.o. female  -Para:     Gestational Age:  39w4d  Admitted for:   Active Labor  Admitted to  [facility].  [facility]    HPI: Patient is admitted with the above mentioned Chief Complaint and States   Loss of fluid:   negative  Abdominal Pain:  negative  Uterine Contractions:  positive  Vaginal Bleeding:  negative  Fetal Movement:  normal  Patient denies fever, chills, nausea, vomiting , headache, visual disturbance, or dysuria  Patient's last menstrual period was 2020 (exact date).  Estimated Date of Delivery: 21  Final CROW: 2021, by Ultrasound    Patient Active Problem List    Diagnosis Date Noted   • History of birth trauma 2021   • History of shoulder dystocia x 20 sec in prior pregnancy 2021   • Abnormal fetal ultrasound - no f/u with Dr Alvarado, needs PP  echo 2020   • Encounter for supervision of high-risk pregnancy with multigravida of advanced maternal age 2020       Admitting DX: Pregnancy with 38 completed weeks gestation [Z3A.38]   Pregnancy Complications:  none  OB Risk Factors:   advanced maternal age, IUGR  Labor State:    Active phase labor.    History:   has a past medical history of Urinary tract infection, site not specified. She also has no past medical history of Addisons disease ([facility]), Adrenal disorder ([facility]), Anemia, Anxiety, Arrhythmia, Arthritis, ASTHMA, Blood transfusion, Cancer ([facility]), CATARACT, CHF (congestive heart failure) ([facility]), Clotting disorder ([facility]), COPD, Cushings syndrome ([facility]), Depression, Diabetes, Diabetic neuropathy ([facility]), EMPHYSEMA, GERD (gastroesophageal reflux disease), Glaucoma, Goiter, Head ache, Headache(784.0), Heart attack (HCC), Heart murmur, HIV (human immunodeficiency virus infection), Hyperlipidemia, Hypertension, IBD (inflammatory bowel disease), Kidney disease, Meningitis, Migraine, Muscle disorder,  "OSTEOPOROSIS, Parathyroid disorder (HCC), Pituitary disease (HCC), Seizure (HCC), Sickle cell disease (HCC), Stroke (HCC), Substance abuse (HCC), Thyroid disease, Tuberculosis, or Ulcer.     has a past surgical history that includes other abdominal surgery and appendectomy ().    OB History    Para Term  AB Living   2 1 1     1   SAB TAB Ectopic Molar Multiple Live Births             1      # Outcome Date GA Lbr Rafael/2nd Weight Sex Delivery Anes PTL Lv   2 Current            1 Term 13 40w0d  3.175 kg (7 lb) F Vag-Spont  N MICHAEL       Medications:  No current facility-administered medications on file prior to encounter.     Current Outpatient Medications on File Prior to Encounter   Medication Sig Dispense Refill   • Prenatal MV-Min-Fe Fum-FA-DHA (PRENATAL 1 PO) Take  by mouth.         Allergies:  Nkda [no known drug allergy]    ROS:   Neuro: negative    Cardiovascular: negative  Gastro intestinal: negative  Genitourinary: negative            Physical Exam:  /69   Pulse 80   Temp 36.3 °C (97.3 °F) (Temporal)   Resp 18   Ht 1.626 m (5' 4\")   Wt 89.4 kg (197 lb)   LMP 2020 (Exact Date)   SpO2 97%   BMI 33.81 kg/m²   Constitutional: healthy-appearing, Well-developed, well-nourished, in no acute distress  No JVD: while supine  HEENT: EOMI  Breast Exam: negative  Cardio: regular rate and rhythm  Lung: unlabored respirations, no intercostal retractions or accessory muscle use, clear to auscultation without rales or wheezes  Abdomen: abdomen is soft without significant tenderness, masses, organomegaly or guarding  Extremity: extremities, peripheral pulses and reflexes normal, no edema, redness or tenderness in the calves or thighs, Homans sign is negative, no sign of DVT, feet normal, good pulses, normal color, temperature and sensation    Cervical Exam: 80%  Cervix Dilatation: 7  Station: negative 1  Pelvis: Adequate  Fetal Assessment: Fetal heart variability: moderate  Fetal Heart " Rate decelerations: none  Fetal Heart Rate accelerations: yes  Baseline FHR: 140 per minute  Uterine contractions: regular, every 2-5 minutes  Estimated Fetal Weight: 2500 - 3000g      Labs:      Prenatal Results     General (Most Recent Result)     Test Value Date Time    ABO O  12/02/20 0949    Rh POS  12/02/20 0949    Antibody screen NEG  12/02/20 0949    HbA1c       Gonorrhea Negative  10/29/20 1458    Chlamydia  by PCR Negative  10/29/20 1458    Chlamydia by DNA       RPR/Syphilus Non-Reactive  01/21/21 0925    HSV 1/2 by PCR (non-serum)       HSV 1/2 (serum)       HSV 1       HSV 2       HPV (16) Negative  10/29/20 1458    HPV (18) Negative  10/29/20 1458    HPV (other) Negative  10/29/20 1458    HBsAg Non-Reactive  12/02/20 0949    HIV-1 HIV-2 Antibodies Non-Reactive  12/02/20 0949    Rubella 107.00 IU/mL 12/02/20 0949    Tb             Pap Smear (Most Recent Result)     Test Value Date Time    Pap smear       Pap smear w/HPV       Pap smear w/CTNG       Pap smar w/HPV CTNG  (See Report)   10/29/20 1458    Pap smear (reflex HPV ACUS)       Pap smear (reflex HPV ASCUS w/CTNG)       Pathology gyn specimen  (See Report)   10/29/20 1458          Urinalysis (Most Recent Result)     Test Value Date Time    Urinalysis       Urinalysis (culture if indicated)       POC urinalysis  (See Report)   10/29/20 1318    Urine drug screen       Urine drug screen (w/o conf)       Urine culture ([id number])       Urine culture ([id number])  Abnormal    (See Report)   05/22/13 1205          1st Trimester     Test Value Date Time    Hgb       Hct       Fasting Glucose Tolerance       GTT, 1 hour       GTT, 2 hours       GTT, 3 hours             2nd Trimester     Test Value Date Time    Hgb 11.7 g/dL 01/21/21 0925      12.5 g/dL 12/02/20 0949    Hct 35.5 % 01/21/21 0925      37.7 % 12/02/20 0949    Urinalysis       Urine Culture       AST       ALT       Uric Acid       Fasting Glucose Tolerance       GTT, 1 hour 170 mg/dL 01/25/21  0845    GTT, 2 hours 161 mg/dL 21 0845    GTT, 3 hours 134 mg/dL 21 0845    Urine Protein/Creatinine Ratio             3rd Trimester     Test Value Date Time    Hgb       Hct       Platelet count       GBS (ALBARRAN BROTH) Negative  21 1439    GBS (Direct) Negative  21 1439    Urinalysis       Urine Culture       Urine Drug Screen       Urine Protein/Creatinine Ratio             Congenital Disease Screening     Test Value Date Time    First Trimester Screen       Quad Screen       Sickle Cell       Thalassemia       [facility]       Cystic Fibrosis Carrier Study                    View all results for this pregnancy                        Assessment:  Gestational Age:  39w4d  Labor State:   Labor, Active  Risk Factors:   advanced maternal age, IUGR. Hx of shoulder dystocia  Pregnancy Complications: None    Patient Active Problem List    Diagnosis Date Noted   • History of birth trauma 2021   • History of shoulder dystocia x 20 sec in prior pregnancy 2021   • Abnormal fetal ultrasound - no f/u with Dr Alvarado, needs PP  echo 2020   • Encounter for supervision of high-risk pregnancy with multigravida of advanced maternal age 2020       Plan:   Admitted for: Active Labor    CBC and UA  routine urinalysis  Antibiotics: Not indicated at this time  GBS negative  IV meds or epidural as desired  Hx of shoulder dystocia    Sissy Amezcua C.N.M.    Dr Jeronimo is the attending today

## 2021-04-20 NOTE — CARE PLAN
Problem: Communication  Goal: The ability to communicate needs accurately and effectively will improve  Outcome: PROGRESSING AS EXPECTED   Encourage pt to express feelings and concerns.  Problem: Safety  Goal: Will remain free from injury  Outcome: PROGRESSING AS EXPECTED  Goal: Will remain free from falls  Outcome: PROGRESSING AS EXPECTED   Call light education provided.   Problem: Infection  Goal: Will remain free from infection  Outcome: PROGRESSING AS EXPECTED   Hand hygiene implemented.

## 2021-04-20 NOTE — PROGRESS NOTES
Pt came in complaining of contractions that started around 0100. Pt is A/O x4. Pt is . Vitals obtained and external heart monitor and toco monitor applied. -135 with moderate variability.     0736- SVE revealed 7/90/-1.    0820- Pt brought back to labor room. External heart monitor and toco connected. -135 with moderate variability. Vitals obtained. IV initiated. Labs obtained and sent. Covid swabs obtained and sent. POC discussed with FOB and pt. Pt and FOB verbalized understanding. No questions at this time.     0845- Pt reports increased pressure. SVE revealed lip. Dr. Alonso called to bedside. Transition RNTana called to bedside.     0903-  of viable female, per Dr. Alonso. APGARS 8/8. Skin-skin with mother.

## 2021-04-20 NOTE — PROGRESS NOTES
Patient arrived to [facility] with infant, SO, and belongings. Received report from JOSE Prado. Discussed POC and oriented patient to call light, emergency cords, room and unit policies. Patient verbalizes understanding.

## 2021-04-20 NOTE — L&D DELIVERY NOTE
SPONTANEOUS VAGINAL DELIVERY PROCEDURE NOTE    PATIENT ID:  NAME:  Lindsay Flanagan  MRN:               [id number]  YOB: 1985    Labor Course: Patient was admitted to Labor and Delivery at 39w4d for active labor.      On 2021  at 09:03, this 35 y.o., now  39w4d , GBS negative female delivered via  a viable female infant weighing 6 lbs and 12.3 oz with APGAR scores of 8 and 8 at one and five minutes. Infant was in OA position upon delivery of the head, and a right anterior shoulder was subsequently delivered without complications. There was a single nuchal cord which was reduced and infant was bulb suctioned at delivery. Cord was doubly clamped, cut and infant handed to RN in attendance. An intact placenta was delivered spontaneously at 09:26 with 3 vessel cord. Upon vaginal exam, there was 2nd degree perineal laceration which was repaired using 3.0 chromic in the usual sterile fashion. Estimated blood loss was 250cc. Patient will be transferred to postpartum in stable condition and infant to  nursery.      Delivery attended by Dr Alonso    I saw and examined the patient and discussed the management with the resident. I reviewed the resident's note and agree with the documented findings and plan of care.    Additional attending comments:  I was present and participated in the entire delivery with the resident.  I performed the repair which was well approximated and hemostatic at the end of the procedure.  Mom and baby were left in stable condition.     Yari Alonso D.O.

## 2021-04-21 ENCOUNTER — PHARMACY VISIT (OUTPATIENT)
Dept: PHARMACY | Facility: MEDICAL CENTER | Age: 36
End: 2021-04-21
Payer: MEDICAID

## 2021-04-21 VITALS
DIASTOLIC BLOOD PRESSURE: 59 MMHG | BODY MASS INDEX: 33.63 KG/M2 | HEIGHT: 64 IN | TEMPERATURE: 98.3 F | WEIGHT: 197 LBS | HEART RATE: 78 BPM | OXYGEN SATURATION: 96 % | RESPIRATION RATE: 20 BRPM | SYSTOLIC BLOOD PRESSURE: 107 MMHG

## 2021-04-21 PROCEDURE — 700102 HCHG RX REV CODE 250 W/ 637 OVERRIDE(OP): Performed by: STUDENT IN AN ORGANIZED HEALTH CARE EDUCATION/TRAINING PROGRAM

## 2021-04-21 PROCEDURE — RXMED WILLOW AMBULATORY MEDICATION CHARGE: Performed by: STUDENT IN AN ORGANIZED HEALTH CARE EDUCATION/TRAINING PROGRAM

## 2021-04-21 PROCEDURE — A9270 NON-COVERED ITEM OR SERVICE: HCPCS | Performed by: STUDENT IN AN ORGANIZED HEALTH CARE EDUCATION/TRAINING PROGRAM

## 2021-04-21 RX ORDER — LANOLIN ALCOHOL/MO/W.PET/CERES
325 CREAM (GRAM) TOPICAL DAILY
Qty: 30 TABLET | Refills: 0 | Status: SHIPPED | OUTPATIENT
Start: 2021-04-21

## 2021-04-21 RX ORDER — PSEUDOEPHEDRINE HCL 30 MG
100 TABLET ORAL 2 TIMES DAILY PRN
Qty: 60 CAPSULE | Refills: 0 | Status: SHIPPED | OUTPATIENT
Start: 2021-04-21

## 2021-04-21 RX ORDER — IBUPROFEN 800 MG/1
800 TABLET ORAL EVERY 6 HOURS PRN
Qty: 60 TABLET | Refills: 0 | Status: SHIPPED | OUTPATIENT
Start: 2021-04-21

## 2021-04-21 RX ORDER — ACETAMINOPHEN 325 MG/1
650 TABLET ORAL EVERY 4 HOURS PRN
Qty: 60 TABLET | Refills: 0 | Status: SHIPPED | OUTPATIENT
Start: 2021-04-21

## 2021-04-21 RX ADMIN — PRENATAL WITH FERROUS FUM AND FOLIC ACID 1 TABLET: 3080; 920; 120; 400; 22; 1.84; 3; 20; 10; 1; 12; 200; 27; 25; 2 TABLET ORAL at 07:44

## 2021-04-21 ASSESSMENT — EDINBURGH POSTNATAL DEPRESSION SCALE (EPDS)
I HAVE BEEN ABLE TO LAUGH AND SEE THE FUNNY SIDE OF THINGS: AS MUCH AS I ALWAYS COULD
I HAVE BEEN SO UNHAPPY THAT I HAVE HAD DIFFICULTY SLEEPING: NOT AT ALL
I HAVE BEEN SO UNHAPPY THAT I HAVE BEEN CRYING: NO, NEVER
I HAVE BEEN ANXIOUS OR WORRIED FOR NO GOOD REASON: NO, NOT AT ALL
I HAVE BLAMED MYSELF UNNECESSARILY WHEN THINGS WENT WRONG: NO, NEVER
THINGS HAVE BEEN GETTING ON TOP OF ME: NO, I HAVE BEEN COPING AS WELL AS EVER
I HAVE FELT SAD OR MISERABLE: NO, NOT AT ALL
I HAVE LOOKED FORWARD WITH ENJOYMENT TO THINGS: AS MUCH AS I EVER DID
THE THOUGHT OF HARMING MYSELF HAS OCCURRED TO ME: NEVER
I HAVE FELT SCARED OR PANICKY FOR NO GOOD REASON: NO, NOT MUCH

## 2021-04-21 NOTE — PROGRESS NOTES
Mother recording babies intake and output on provided clipboard. Watched baby latch and feed throughout the day. No issues noted, mother not in need of assistance.

## 2021-04-21 NOTE — PROGRESS NOTES
Discussed discharge education, and follow up information for infant and MOB. Infant and MOB's bands matched. Cord clamp off. Cuddles removed. Infant placed in car seat by MOB. Checked per RN. Infant and MOB escorted by RN to [facility]. Infant and MOB in stable condition.

## 2021-04-21 NOTE — LACTATION NOTE
"This note was copied from a baby's chart.  ELIAN is Vietnamese speaking used ipad  Leodan #[id number]. Baby 39.4 weeks, , MOB Hx AMA. MOB reports she  her first baby for 1 month. She stopped breastfeeding because her nipples were cracked and bleeding. ELIAN has baby latched independently with shallow latch & swaddled at this time, baby removed & nipple has blister and misshapened. Discussed with mother positioning baby at breast nipple to nose & waiting for baby to open wide for deep latch. MOB requests latch assist from , baby unlatched & mother repositioned with pillows. Baby placed STS, using cross cradle hold on left breast, nipple to nose. Baby opened wide and mother brought baby swiftly in for deep latch- observed, see latch assessment score. ELIAN reports latch feels like \"tug\".     Teaching on hunger cues, breastfeeding when baby shows cues, breastfeed a minimum 8 times in 24 hours no longer than 4 hours from last feed, importance of STS, deep latch with large amount of areola in mouth & cluster feeding.     ELIAN has [facility], encouraged mother to follow-up with WIC once discharged to home.     Breastfeeding plan:  Breastfeed on cue a minimum 8x/24 hours no longer than 4 hours from last feed.    "

## 2021-04-21 NOTE — DISCHARGE INSTRUCTIONS
Follow up with the pregnancy center in 5 weeks for your post-partum check  Take iron supplement daily as prescribed    Take colace as needed for constipation  Continue to take your prenatal vitamin   Take Tylenol and/or Motrin as needed for pain control  Nothing per vagina for 5 weeks  Return for any new or worsening complaints     PATIENT DISCHARGE EDUCATION INSTRUCTION SHEET  REASONS TO CALL YOUR OBSTETRICIAN  · Persistent fever, shaking, chills (Temperature higher than 100.4) may indicate you have an infection  · Heavy bleeding: soaking more than 1 pad per hour; Passing clots an egg-sized clot or bigger may mean you have an postpartum hemorrhage  · Foul odor from vagina or bad smelling or discolored discharge or blood  · Breast infection (Mastitis symptoms); breast pain, chills, fever, redness or red streaks, may feel flu like symptoms  · Urinary pain, burning or frequency  · Incision that is not healing, increased redness, swelling, tenderness or pain, or any pus from episiotomy or  site may mean you have an infection  · Redness, swelling, warmth, or painful to touch in the calf area of your leg may mean you have a blood clot  · Severe or intensified depression, thoughts or feelings of wanting to hurt yourself or someone else   · Pain in chest, obstructed breathing or shortness of breath (trouble catching your breath) may mean you are having a postpartum complication. Call your provider immediately   · Headache that does not get better, even after taking medicine, a bad headache with vision changes or pain in the upper right area of your belly may mean you have high blood pressure or post birth preeclampsia. Call your provider immediately    HAND WASHING  All family and friends should wash their hands:  · Before and after holding the baby  · Before feeding the baby  · After using the restroom or changing the baby's diaper    WOUND CARE  Ask your physician for additional care instructions. In  general:  ·  Incision:  · May shower and pat incision dry   · Keep the incision clean and dry  · There should not be any opening or pus from the incision  · Continue to walk at home 3 times a day   · Do NOT lift anything heavier than your baby (over 10 pounds)  · Encourage family to help participate in care of the  to allow rest and mom time to heal  · Episiotomy/Laceration  · May use matty-spray bottle, witch hazel pads and dermaplast spray for comfort  · Use matty-spray bottle after urinating to cleanse perineal area  · To prevent burning during urination spray matty-water bottle on labial area   · Pat perineal area dry until episiotomy/laceration is healed  · Continue to use matty-bottle until bleeding stops as needed  · If have a 2nd degree laceration or greater, a Sitz bath can offer relief from soreness, burning, and inflammation   · Sitz Bath   · Sit in 6 inches of warm water and soak laceration as needed until the laceration heals    VAGINAL CARE AND BLEEDING  · Nothing inside vagina for 6 weeks:   · No sexual intercourse, tampons or douching  · Bleeding may continue for 2-4 weeks. Amount and color may vary  · Soaking 1 pad or more in an hour for several hours is considered heavy bleeding  · Passing large egg sized blood clots can be concerning  · If you feel like you have heavy bleeding or are having increasing amount of blood clots call your Obstetrician immediately  · If you begin feeling faint upon standing, feeling sick to your stomach, have clammy skin, a really fast heartbeat, have chills, start feeling confused, dizzy, sleepy or weak, or feeling like you're going to faint call your Obstetrician immediately    HYPERTENSION   Preeclampsia or gestational hypertension are types of high blood pressure that only pregnant women can get. It is important for you to be aware of symptoms to seek early intervention and treatment. If you have any of these symptoms immediately call your Obstetrician   "  · Vision changes or blurred vision   · Severe headache or pain that is unrelieved with medication and will not go away  · Persistent pain in upper abdomen or shoulder   · Increased swelling of face, feet, or hands  · Difficulty breathing or shortness of breath at rest  · Urinating less than usual    URINATION AND BOWEL MOVEMENTS  · Eating more fiber (bran cereal, fruits, and vegetables) and drinking plenty of fluids will help to avoid constipation  · Urinary frequency and urgency after childbirth is normal  · If you experience any urinary pain, burning or frequency call your provider    BIRTH CONTROL  · It is possible to become pregnant at any time after delivery and while breastfeeding  · Plan to discuss a method of birth control with your physician at your post delivery follow up visit    POSTPARTUM BLUES  During the first few days after birth, you may experience a sense of the \"blues\" which may include impatience, irritability or even crying. These feelings come and go quickly. However, as many as 1 in 10 women experience emotional symptoms known as postpartum depression.     POSTPARTUM DEPRESSION    May start as early as the second or third day after delivery or take several weeks or months to develop. Symptoms of \"blues\" are present, but are more intense: Crying spells; loss of appetite; feelings of hopelessness or loss of control; fear of touching the baby; over concern or no concern at all about the baby; little or no concern about your own appearance/caring for yourself; and/or inability to sleep or excessive sleeping. Contact your Obstetrician if you are experiencing any of these symptoms     PREVENTING SHAKEN BABY  If you are angry or stressed, PUT THE BABY IN THE CRIB, step away, take some deep breaths, and wait until you are calm to care for the baby. DO NOT SHAKE THE BABY. You are not alone, call a supporter for help.  · [facility] 24/7 crisis call line (592-185-6730) or " "(1-828.625.4730)  · You can also text them, text \"ANSWER\" (806211)      "

## 2021-04-21 NOTE — DISCHARGE PLANNING
Meds-to-Beds: Discharge prescription orders listed below delivered to patient's bedside. RN notified. Patient counseled in Togolese using  services ID [id number] Pa.       Gastelum Panchito Lindsay   Home Medication Instructions JENNIE:[id number]    Printed on:04/21/21 1207   Medication Information                      acetaminophen (TYLENOL) 325 MG Tab  Take 2 Tablets by mouth every four hours as needed.             docusate sodium 100 MG Cap  Take 1 capsule by mouth 2 times a day as needed for Constipation.             ferrous sulfate 325 (65 Fe) MG EC tablet  Take 1 tablet by mouth every day.             ibuprofen (MOTRIN) 800 MG Tab  Take 1 tablet by mouth every 6 hours as needed.               Maureen Fofana, PharmD

## 2021-04-21 NOTE — CARE PLAN
Problem: Altered physiologic condition related to immediate post-delivery state and potential for bleeding/hemorrhage  Goal: Patient physiologically stable as evidenced by normal lochia, palpable uterine involution and vital signs within normal limits  Outcome: PROGRESSING AS EXPECTED  Note: Regularly assessing vaginal discharge. Fundus firm. Vital signs WNL.     Problem: Potential for postpartum infection related to presence of episiotomy/vaginal tear and/or uterine contamination  Goal: Patient will be absent from signs and symptoms of infection  Outcome: PROGRESSING AS EXPECTED  Note: Continuously assessing for signs and symptoms of infection. Standard precautions being implemented. Will continue to monitor.

## 2021-04-21 NOTE — PROGRESS NOTES
Report received from Lauren GARCIA. Will resume pt care.     Introduced myself to pt. Pt states she is doing well, has been getting up to the bathroom and voiding, is breastfeeding, and has no pain at this time. Advised pt to call for assistance with breastfeeding. Discussed pain medication available, as well as, tucks and spray to use at vaginal area with discomfort. Call light is within reach.

## 2021-04-21 NOTE — DISCHARGE SUMMARY
[facility]  NORMAL SPONTANEOUS VAGINAL DISCHARGE SUMMARY    PATIENT ID:  NAME:  Lindsay Flanagan  MRN:               [id number]  YOB: 1985    DATE OF ADMISSION: 2021    DATE OF DISCHARGE: 2021     ADMITTING DIAGNOSIS:  1. Intrauterine pregnancy at 39w4d.  2.Active labor  3. Hx of shoulder dystocia in prior pregnancy  4. AMA    DISCHARGE DIAGNOSIS:  1. s/p       HOSPITAL COURSE: This is a 35 y.o. year old female admitted at 39w4d who presented with increasing contractions, no LOF, no vaginal bleeding, normal FM and in active labor. Pt was 7 cm dilated, 80% effaced and at  -1 station on sterile vaginal exam. Pregnancy was complicated by AMA. The patient had a good labor pattern after admission and proceeded to deliver a viable female infant weighing  6 lbs and 12.3 oz. Infants Apgars scores were 8 and 8 at one and five minutes. The patients postpartum course was uncomplicated and she was discharged home in stable condition on postpartum day #1.    PROCEDURES PERFORMED: Normal spontaneous vaginal delivery over 2nd degree lac  which was repaired using 3-0 chromic in the usual sterile fashion.    COMPLICATIONS: None    DIET: Regular    Objective:    Vitals:    21 1800 21 2200 21 0300 21 0600   BP: 121/70 126/68 121/69 107/59   Pulse: 81 86 89 78   Resp:    Temp: 37.2 °C (99 °F) 37.2 °C (98.9 °F) 37.2 °C (99 °F) 36.8 °C (98.3 °F)   TempSrc: Temporal Temporal Temporal Temporal   SpO2: 97% 96% 89% 96%   Weight:       Height:         General: No acute distress, resting comfortably in bed.  HEENT: normocephalic, nontraumatic, PERRLA, EOMI  Cardiovascular: Heart RRR with no murmurs, rubs or gallops. Distal Pulses 2+  Respiratory: symmetric chest expansion, lungs CTA bilaterally with no wheezes rales or rhonci  Abdomen: soft, mildly tender, fundus firm, +BS  Genitourinary: lochia light, denies excessive vaginal bleeding  Musculoskeletal: strength 5/5 in four  extremities  Neuro: non focal with no numbness, tingling or changes in sensation    Recent Labs     04/20/21  0800 04/20/21  1653   WBC 9.0 13.6*   RBC 4.36 3.76*   HEMOGLOBIN 12.5 10.6*   HEMATOCRIT 37.7 32.7*   MCV 86.5 87.0   MCH 28.7 28.2   RDW 44.9 45.0   PLATELETCT 199 203   MPV 10.6 10.5   NEUTSPOLYS 80.30*  --    LYMPHOCYTES 13.10*  --    MONOCYTES 5.40  --    EOSINOPHILS 0.40  --    BASOPHILS 0.20  --      No results for input(s): SODIUM, POTASSIUM, CHLORIDE, CO2, GLUCOSE, BUN, CPKTOTAL in the last 72 hours.    ACTIVITY: No intercourse and nothing inserted into the vagina for 5 weeks.    MEDICATIONS:  Current Outpatient Medications   Medication Sig Dispense Refill   • acetaminophen (TYLENOL) 325 MG Tab Take 2 Tablets by mouth every four hours as needed. 60 tablet 0   • docusate sodium 100 MG Cap Take 100 mg by mouth 2 times a day as needed for Constipation. 60 capsule 0   • ibuprofen (MOTRIN) 800 MG Tab Take 1 tablet by mouth every 6 hours as needed. 60 tablet 0   • ferrous sulfate 325 (65 Fe) MG tablet Take 1 tablet by mouth every day. 30 tablet 0         FOLLOWUP:  1) [facility] in 5 weeks for routine postpartum check  2) Return to the hospital if copious vaginal bleeding or foul smelling discharge is noted  3) Patient would like to use condoms for birth control at this point. Encouraged the patient to discuss other methods of contraception at follow up appointment.

## 2021-04-22 NOTE — PROGRESS NOTES
Late Entry: 4/20/21    1900: Received bedside report from JOSE Palma. Patient in bed, declines pain at this time. Whiteboards updated, POC discussed. Call light within reach. Patient encouraged to call with any needs and or concerns.     2100: Report given to JOSE Aguilar.    no

## 2021-04-27 ENCOUNTER — NURSE TRIAGE (OUTPATIENT)
Dept: HEALTH INFORMATION MANAGEMENT | Facility: OTHER | Age: 36
End: 2021-04-27

## 2021-05-25 ENCOUNTER — POST PARTUM (OUTPATIENT)
Dept: OBGYN | Facility: CLINIC | Age: 36
End: 2021-05-25

## 2021-05-25 VITALS — BODY MASS INDEX: 29.52 KG/M2 | WEIGHT: 172 LBS | SYSTOLIC BLOOD PRESSURE: 102 MMHG | DIASTOLIC BLOOD PRESSURE: 62 MMHG

## 2021-05-25 PROBLEM — O09.529 ENCOUNTER FOR SUPERVISION OF HIGH-RISK PREGNANCY WITH MULTIGRAVIDA OF ADVANCED MATERNAL AGE: Status: RESOLVED | Noted: 2020-11-06 | Resolved: 2021-05-25

## 2021-05-25 PROBLEM — O28.3 ABNORMAL FETAL ULTRASOUND: Status: RESOLVED | Noted: 2020-12-11 | Resolved: 2021-05-25

## 2021-05-25 PROCEDURE — 0503F POSTPARTUM CARE VISIT: CPT | Performed by: NURSE PRACTITIONER

## 2021-05-25 ASSESSMENT — ENCOUNTER SYMPTOMS
MUSCULOSKELETAL NEGATIVE: 1
CARDIOVASCULAR NEGATIVE: 1
NEUROLOGICAL NEGATIVE: 1
GASTROINTESTINAL NEGATIVE: 1
RESPIRATORY NEGATIVE: 1
CONSTITUTIONAL NEGATIVE: 1
PSYCHIATRIC NEGATIVE: 1
EYES NEGATIVE: 1

## 2021-05-25 ASSESSMENT — EDINBURGH POSTNATAL DEPRESSION SCALE (EPDS)
I HAVE LOOKED FORWARD WITH ENJOYMENT TO THINGS: AS MUCH AS I EVER DID
I HAVE FELT SAD OR MISERABLE: NO, NOT AT ALL
I HAVE BEEN SO UNHAPPY THAT I HAVE HAD DIFFICULTY SLEEPING: NOT AT ALL
THINGS HAVE BEEN GETTING ON TOP OF ME: NO, MOST OF THE TIME I HAVE COPED QUITE WELL
I HAVE FELT SCARED OR PANICKY FOR NO GOOD REASON: NO, NOT AT ALL
THE THOUGHT OF HARMING MYSELF HAS OCCURRED TO ME: NEVER
I HAVE BEEN SO UNHAPPY THAT I HAVE BEEN CRYING: NO, NEVER
I HAVE BLAMED MYSELF UNNECESSARILY WHEN THINGS WENT WRONG: NO, NEVER
I HAVE BEEN ABLE TO LAUGH AND SEE THE FUNNY SIDE OF THINGS: NOT QUITE SO MUCH NOW
TOTAL SCORE: 2
I HAVE BEEN ANXIOUS OR WORRIED FOR NO GOOD REASON: NO, NOT AT ALL

## 2021-05-25 NOTE — PROGRESS NOTES
Subjective:    Lindsay Flanagan is a 35 y.o. female who presents for her postpartum exam 5 weeks following . Her prenatal course was complicated by abnormal fetal US, but everything has been fine with baby since delivery. She was also followed closely due to AMA status. She denies dysuria, vaginal bleeding, odor, itching or breast problems. She is breast and bottle feeding without concerns. She desires to use condoms for her birth control method. Reports no sex prior to this appointment. Eating a regular diet without difficulty. Bowel movement are Normal.  The patient is not having any pain. Spotting. Patient Denies Incisional pain, drainage or redness. Patient denies any s/sx of postpartum depression. EPDS today is 0.    Problem List     Patient Active Problem List    Diagnosis Date Noted   • History of birth trauma 2021   • History of shoulder dystocia x 20 sec in prior pregnancy 2021   • Abnormal fetal ultrasound - no f/u with Dr Alvarado, needs PP  echo 2020   • Encounter for supervision of high-risk pregnancy with multigravida of advanced maternal age 2020       Objective    See PE  Lab: H&H at d/c: 10.6/32.7  /62   Wt 78 kg (172 lb)   LMP 2020 (Exact Date)   BMI 29.52 kg/m²     Assessment:    1. PP care of lactating women   2. Exam WNL   3. Pap WNL on 10/2020  4. Desires contraception- condoms for now       Plan:    1. Breastfeeding support   2. Continue PNV   3. Contraceptive counseling - follow up w health dept,  [facility], or [facility] for contraceptive changes, future pregnancy, or other women's health care needs  4. Encouraged condom use for STI and pregnancy prevention  5. Discussed diet, exercise and resumption of sexual activity   6. Preconception guidance for next pregnancy if applicable. Discussed AMA and pregnancy spacing risk factors. Folic acid for all women of childbearing age.     HPI    Review of Systems   Constitutional: Negative.     HENT: Negative.    Eyes: Negative.    Respiratory: Negative.    Cardiovascular: Negative.    Gastrointestinal: Negative.    Genitourinary: Negative.    Musculoskeletal: Negative.    Skin: Negative.    Neurological: Negative.    Endo/Heme/Allergies: Negative.    Psychiatric/Behavioral: Negative.    All other systems reviewed and are negative.         Objective:     /62   Wt 78 kg (172 lb)   LMP 07/17/2020 (Exact Date)   BMI 29.52 kg/m²      Physical Exam  Vitals and nursing note reviewed.   Constitutional:       Appearance: Normal appearance. She is normal weight.   HENT:      Head: Normocephalic.      Nose: Nose normal.   Eyes:      Conjunctiva/sclera: Conjunctivae normal.   Cardiovascular:      Rate and Rhythm: Normal rate.      Pulses: Normal pulses.      Heart sounds: Normal heart sounds.   Pulmonary:      Effort: Pulmonary effort is normal.      Breath sounds: Normal breath sounds.   Abdominal:      Palpations: Abdomen is soft.   Genitourinary:     General: Normal vulva.      Comments: Perineal laceration well healed and approximated  Musculoskeletal:         General: Normal range of motion.      Cervical back: Normal range of motion.   Skin:     General: Skin is warm and dry.   Neurological:      General: No focal deficit present.      Mental Status: She is alert and oriented to person, place, and time.   Psychiatric:         Mood and Affect: Mood normal.         Behavior: Behavior normal.         Thought Content: Thought content normal.         Judgment: Judgment normal.            Assessment/Plan:     1. Postpartum care and examination of lactating mother

## 2021-05-25 NOTE — NON-PROVIDER
Pt here today for postpartum exam.  Delivery type: vaginal  Currently :breast/ bottle\ feeding   Desired BCM:condoms    LMP: Pt states she still spotting from birth   Last pap: 10/2020  Phone # [phone]  Pharmacy verified    [id number]  EPDS  2

## 2023-06-22 NOTE — TELEPHONE ENCOUNTER
"Patient is having pain in rectum and bladder, when urinating, defecating and passing gas for last 3 days. Patient is concerned. Transferred to PCP office to discuss concerns and scheduled earlier appointment with office.     Reason for Disposition  • Pain or burning with passing urine (urination)    Additional Information  • Negative: Shock suspected (e.g., cold/pale/clammy skin, too weak to stand, low BP, rapid pulse)  • Negative: Sounds like a life-threatening emergency to the triager  • Negative: Followed a genital area injury  • Negative: Taking antibiotic for urinary tract infection (UTI)  • Negative: Pregnant  • Negative: [1] Unable to urinate (or only a few drops) > 4 hours AND     [2] bladder feels very full (e.g., palpable bladder or strong urge to urinate)  • Negative: Fever > 100.4 F (38.0 C)  • Negative: Foul smelling vaginal discharge (i.e., lochia)  • Negative: Patient sounds very sick or weak to the triager  • Negative: SEVERE pain with urination  (e.g., excruciating)  • Negative: Side (flank) or back pain present  • Negative: Diabetes mellitus or weak immune system (e.g., HIV positive, cancer chemo, splenectomy, organ transplant, chronic steroids)  • Negative: Artificial heart valve or artificial joint  • Negative: Blood in urine (red, pink, or tea-colored)  • Negative: [1] Painful urination AND [2] EITHER frequency or urgency    Answer Assessment - Initial Assessment Questions  1. SEVERITY: \"How bad is the pain?\"   (e.g., Scale 1-10; mild, moderate, or severe)    - MILD (1-3): complains slightly about urination hurting    - MODERATE (4-7): interferes with normal activities     - SEVERE (8-10): excruciating, unwilling or unable to urinate because of the pain       Moderate pain about a 7 out of 10  2. FREQUENCY: \"How many times have you had painful urination today?\"       Every time she uses the restroom  3. PATTERN: \"Is pain present every time you urinate or just sometimes?\"      Every time she uses " "the restroom  4. ONSET: \"When did the painful urination start?\"       2-3 days after labor  5. FEVER: \"Do you have a fever?\" If so, ask: \"What is your temperature, how was it measured, and when did it start?\"     no  6. CAUSE: \"What do you think is causing the painful urination?\"       unknown  7. OTHER SYMPTOMS: \"Do you have any other symptoms?\" (e.g., flank pain, vaginal discharge, genital sores, blood in urine.   Feels the bleeding is the same as it has been , feels it is normal bleeding after delivery  8. DELIVERY DATE: \"When was your delivery date?\" \"Vaginal delivery or ?\"     Protocols used: POSTPARTUM - URINATION PAIN-A-AH      " Dapsone Pregnancy And Lactation Text: This medication is Pregnancy Category C and is not considered safe during pregnancy or breast feeding.